# Patient Record
Sex: FEMALE | Race: WHITE | NOT HISPANIC OR LATINO | Employment: UNEMPLOYED | URBAN - METROPOLITAN AREA
[De-identification: names, ages, dates, MRNs, and addresses within clinical notes are randomized per-mention and may not be internally consistent; named-entity substitution may affect disease eponyms.]

---

## 2017-07-12 ENCOUNTER — HOSPITAL ENCOUNTER (OUTPATIENT)
Dept: LAB | Facility: MEDICAL CENTER | Age: 32
End: 2017-07-12
Attending: FAMILY MEDICINE
Payer: OTHER GOVERNMENT

## 2017-07-12 ENCOUNTER — TELEPHONE (OUTPATIENT)
Dept: MEDICAL GROUP | Facility: PHYSICIAN GROUP | Age: 32
End: 2017-07-12

## 2017-07-12 ENCOUNTER — OFFICE VISIT (OUTPATIENT)
Dept: MEDICAL GROUP | Facility: PHYSICIAN GROUP | Age: 32
End: 2017-07-12
Payer: OTHER GOVERNMENT

## 2017-07-12 VITALS
SYSTOLIC BLOOD PRESSURE: 116 MMHG | HEART RATE: 78 BPM | OXYGEN SATURATION: 97 % | TEMPERATURE: 98.1 F | BODY MASS INDEX: 39.4 KG/M2 | WEIGHT: 260 LBS | DIASTOLIC BLOOD PRESSURE: 68 MMHG | HEIGHT: 68 IN

## 2017-07-12 DIAGNOSIS — E66.9 OBESITY (BMI 35.0-39.9 WITHOUT COMORBIDITY): ICD-10-CM

## 2017-07-12 DIAGNOSIS — Z13.220 ENCOUNTER FOR SCREENING FOR LIPID DISORDER: ICD-10-CM

## 2017-07-12 DIAGNOSIS — E89.0 POSTOPERATIVE HYPOTHYROIDISM: ICD-10-CM

## 2017-07-12 DIAGNOSIS — Z86.39 HISTORY OF GRAVES' DISEASE: ICD-10-CM

## 2017-07-12 DIAGNOSIS — Z13.21 ENCOUNTER FOR VITAMIN DEFICIENCY SCREENING: ICD-10-CM

## 2017-07-12 DIAGNOSIS — Z85.850 HISTORY OF THYROID CANCER: ICD-10-CM

## 2017-07-12 LAB
25(OH)D3 SERPL-MCNC: 20 NG/ML (ref 30–100)
ALBUMIN SERPL BCP-MCNC: 4.3 G/DL (ref 3.2–4.9)
ALBUMIN/GLOB SERPL: 1.4 G/DL
ALP SERPL-CCNC: 55 U/L (ref 30–99)
ALT SERPL-CCNC: 13 U/L (ref 2–50)
ANION GAP SERPL CALC-SCNC: 8 MMOL/L (ref 0–11.9)
AST SERPL-CCNC: 14 U/L (ref 12–45)
BASOPHILS # BLD AUTO: 0.4 % (ref 0–1.8)
BASOPHILS # BLD: 0.03 K/UL (ref 0–0.12)
BILIRUB SERPL-MCNC: 0.4 MG/DL (ref 0.1–1.5)
BUN SERPL-MCNC: 11 MG/DL (ref 8–22)
CALCIUM SERPL-MCNC: 9.1 MG/DL (ref 8.5–10.5)
CHLORIDE SERPL-SCNC: 106 MMOL/L (ref 96–112)
CHOLEST SERPL-MCNC: 215 MG/DL (ref 100–199)
CO2 SERPL-SCNC: 26 MMOL/L (ref 20–33)
CREAT SERPL-MCNC: 0.74 MG/DL (ref 0.5–1.4)
EOSINOPHIL # BLD AUTO: 0 K/UL (ref 0–0.51)
EOSINOPHIL NFR BLD: 0 % (ref 0–6.9)
ERYTHROCYTE [DISTWIDTH] IN BLOOD BY AUTOMATED COUNT: 40.5 FL (ref 35.9–50)
GFR SERPL CREATININE-BSD FRML MDRD: >60 ML/MIN/1.73 M 2
GLOBULIN SER CALC-MCNC: 3 G/DL (ref 1.9–3.5)
GLUCOSE SERPL-MCNC: 87 MG/DL (ref 65–99)
HCT VFR BLD AUTO: 43.4 % (ref 37–47)
HDLC SERPL-MCNC: 45 MG/DL
HGB BLD-MCNC: 14.4 G/DL (ref 12–16)
IMM GRANULOCYTES # BLD AUTO: 0.02 K/UL (ref 0–0.11)
IMM GRANULOCYTES NFR BLD AUTO: 0.3 % (ref 0–0.9)
LDLC SERPL CALC-MCNC: 133 MG/DL
LYMPHOCYTES # BLD AUTO: 3.09 K/UL (ref 1–4.8)
LYMPHOCYTES NFR BLD: 41.5 % (ref 22–41)
MCH RBC QN AUTO: 29.4 PG (ref 27–33)
MCHC RBC AUTO-ENTMCNC: 33.2 G/DL (ref 33.6–35)
MCV RBC AUTO: 88.6 FL (ref 81.4–97.8)
MONOCYTES # BLD AUTO: 0.31 K/UL (ref 0–0.85)
MONOCYTES NFR BLD AUTO: 4.2 % (ref 0–13.4)
NEUTROPHILS # BLD AUTO: 3.99 K/UL (ref 2–7.15)
NEUTROPHILS NFR BLD: 53.6 % (ref 44–72)
NRBC # BLD AUTO: 0 K/UL
NRBC BLD AUTO-RTO: 0 /100 WBC
PLATELET # BLD AUTO: 246 K/UL (ref 164–446)
PMV BLD AUTO: 10.4 FL (ref 9–12.9)
POTASSIUM SERPL-SCNC: 4.3 MMOL/L (ref 3.6–5.5)
PROT SERPL-MCNC: 7.3 G/DL (ref 6–8.2)
RBC # BLD AUTO: 4.9 M/UL (ref 4.2–5.4)
SODIUM SERPL-SCNC: 140 MMOL/L (ref 135–145)
TRIGL SERPL-MCNC: 184 MG/DL (ref 0–149)
TSH SERPL DL<=0.005 MIU/L-ACNC: 1.2 UIU/ML (ref 0.3–3.7)
WBC # BLD AUTO: 7.4 K/UL (ref 4.8–10.8)

## 2017-07-12 PROCEDURE — 99204 OFFICE O/P NEW MOD 45 MIN: CPT | Performed by: FAMILY MEDICINE

## 2017-07-12 PROCEDURE — 82306 VITAMIN D 25 HYDROXY: CPT

## 2017-07-12 PROCEDURE — 36415 COLL VENOUS BLD VENIPUNCTURE: CPT

## 2017-07-12 PROCEDURE — 80061 LIPID PANEL: CPT

## 2017-07-12 PROCEDURE — 80053 COMPREHEN METABOLIC PANEL: CPT

## 2017-07-12 PROCEDURE — 84443 ASSAY THYROID STIM HORMONE: CPT

## 2017-07-12 PROCEDURE — 85025 COMPLETE CBC W/AUTO DIFF WBC: CPT

## 2017-07-12 RX ORDER — LEVOTHYROXINE SODIUM 112 UG/1
112 TABLET ORAL
COMMUNITY
End: 2017-07-12

## 2017-07-12 RX ORDER — LEVOTHYROXINE SODIUM 0.1 MG/1
100 TABLET ORAL
Qty: 30 TAB | Refills: 3 | Status: SHIPPED | OUTPATIENT
Start: 2017-07-12 | End: 2017-10-02 | Stop reason: SDUPTHER

## 2017-07-12 RX ORDER — LEVOTHYROXINE SODIUM 112 UG/1
112 TABLET ORAL
Qty: 30 TAB | Refills: 3 | Status: SHIPPED | OUTPATIENT
Start: 2017-07-12 | End: 2017-10-02 | Stop reason: SDUPTHER

## 2017-07-12 RX ORDER — LEVOTHYROXINE SODIUM 0.1 MG/1
100 TABLET ORAL
COMMUNITY
End: 2017-07-12

## 2017-07-12 ASSESSMENT — PAIN SCALES - GENERAL: PAINLEVEL: NO PAIN

## 2017-07-12 ASSESSMENT — PATIENT HEALTH QUESTIONNAIRE - PHQ9: CLINICAL INTERPRETATION OF PHQ2 SCORE: 0

## 2017-07-12 NOTE — Clinical Note
[facility]  Pcp Pt States None  No address on file  Fax: [phone]   Authorization for Release/Disclosure of   Protected Health Information   Name: CHELSEY SHAH : 1985 SSN: [id number]   Address: [address] [place] [address]  [place] [address] Phone:    [phone] (home)    I authorize the entity listed below to release/disclose the PHI below to:   [facility]/Pcp Pt States None and Balbir Arvizu M.D.   Provider or Entity Name:  [facility]   Address   City, [facility]   Phone:  [phone]    Fax:     Reason for request: continuity of care   Information to be released:    [  ] LAST COLONOSCOPY,  including any PATH REPORT and follow-up  [  ] LAST FIT/COLOGUARD RESULT [  ] LAST DEXA  [  ] LAST MAMMOGRAM  [  ] LAST PAP  [  ] LAST LABS [  ] RETINA EXAM REPORT  [  ] IMMUNIZATION RECORDS  [XX  ] Release all info FROM THYROID REMOVAL SURGY      [  ] Check here and initial the line next to each item to release ALL health information INCLUDING  _____ Care and treatment for drug and / or alcohol abuse  _____ HIV testing, infection status, or AIDS  _____ Genetic Testing    DATES OF SERVICE OR TIME PERIOD TO BE DISCLOSED: _____________  I understand and acknowledge that:  * This Authorization may be revoked at any time by you in writing, except if your health information has already been used or disclosed.  * Your health information that will be used or disclosed as a result of you signing this authorization could be re-disclosed by the recipient. If this occurs, your re-disclosed health information may no longer be protected by State or Federal laws.  * You may refuse to sign this Authorization. Your refusal will not affect your ability to obtain treatment.  * This Authorization becomes effective upon signing and will  on (date) __________.      If no date is indicated, this Authorization will  one (1) year from the signature date.    Name: Chelsey Shah    Signature:   Date:     2017            PLEASE FAX REQUESTED RECORDS BACK TO: [phone]

## 2017-07-12 NOTE — MR AVS SNAPSHOT
"        Meli Shah   2017 8:40 AM   Office Visit   MRN: [id number]    Department:  [facility]   Dept Phone:  [phone]    Description:  Female : 1985   Provider:  Balbir Arvizu M.D.           Reason for Visit     Establish Care           Allergies as of 2017     Allergen Noted Reactions    Morphine 2017   Itching      You were diagnosed with     Postoperative hypothyroidism   [446585]       History of thyroid cancer   [183008]       Obesity (BMI 35.0-39.9 without comorbidity) ([facility])   [386208]       Encounter for screening for lipid disorder   [8734379]       Encounter for vitamin deficiency screening   [596941]       History of Graves' disease   [276491]         Vital Signs     Blood Pressure Pulse Temperature Height Weight Body Mass Index    116/68 mmHg 78 36.7 °C (98.1 °F) 1.727 m (5' 8\") 117.935 kg (260 lb) 39.54 kg/m2    Oxygen Saturation Last Menstrual Period Breastfeeding? Smoking Status          97% 2017 No Never Smoker         Basic Information     Date Of Birth Sex Race Ethnicity Preferred Language    1985 Female White Non- English      Your appointments     Jin 15, 2018  8:00 AM   Established Patient with Balbir Arvizu M.D.   [facility] ([address] [address] [phone]           You will be receiving a confirmation call a few days before your appointment from our automated call confirmation system.              Problem List              ICD-10-CM Priority Class Noted - Resolved    Postoperative hypothyroidism E89.0   2017 - Present    History of thyroid cancer Z85.850   2017 - Present    Obesity (BMI 35.0-39.9 without comorbidity) (HCC) E66.9   2017 - Present    History of Graves' disease Z86.39   2017 - Present      Health Maintenance        Date Due Completion Dates    IMM DTaP/Tdap/Td Vaccine (1 - Tdap) 3/28/2003 ---    PAP SMEAR 3/28/2005 ---    IMM INFLUENZA (1) 2017 " ---            Current Immunizations     No immunizations on file.      Below and/or attached are the medications your provider expects you to take. Review all of your home medications and newly ordered medications with your provider and/or pharmacist. Follow medication instructions as directed by your provider and/or pharmacist. Please keep your medication list with you and share with your provider. Update the information when medications are discontinued, doses are changed, or new medications (including over-the-counter products) are added; and carry medication information at all times in the event of emergency situations     Allergies:  MORPHINE - Itching               Medications  Valid as of: July 12, 2017 -  9:22 AM    Generic Name Brand Name Tablet Size Instructions for use    Levothyroxine Sodium (Tab) SYNTHROID 100 MCG Take 1 Tab by mouth Every morning on an empty stomach.        Levothyroxine Sodium (Tab) SYNTHROID 112 MCG Take 1 Tab by mouth Every morning on an empty stomach.        .                 Medicines prescribed today were sent to:     [id number] - [place], [place] - [address] [address]    Phone: [phone] Fax: [phone]    Open 24 Hours?: No      Medication refill instructions:       If your prescription bottle indicates you have medication refills left, it is not necessary to call your provider’s office. Please contact your pharmacy and they will refill your medication.    If your prescription bottle indicates you do not have any refills left, you may request refills at any time through one of the following ways: The online [employer] system (except Urgent Care), by calling your provider’s office, or by asking your pharmacy to contact your provider’s office with a refill request. Medication refills are processed only during regular business hours and may not be available until the next business day. Your provider may request additional  information or to have a follow-up visit with you prior to refilling your medication.   *Please Note: Medication refills are assigned a new Rx number when refilled electronically. Your pharmacy may indicate that no refills were authorized even though a new prescription for the same medication is available at the pharmacy. Please request the medicine by name with the pharmacy before contacting your provider for a refill.        Your To Do List     Future Labs/Procedures Complete By Expires    CBC WITH DIFFERENTIAL  As directed 7/13/2018    COMP METABOLIC PANEL  As directed 7/13/2018    LIPID PROFILE  As directed 7/13/2018    TSH WITH REFLEX TO FT4  As directed 7/12/2018    US-SOFT TISSUES OF HEAD - NECK  As directed 7/12/2018    VITAMIN D,25 HYDROXY  As directed 7/13/2018         MyChart Access Code: Activation code not generated  Current StudyRoom Status: Active

## 2017-07-12 NOTE — PROGRESS NOTES
Meli Shah is a 32 y.o. female here for establishing care and discuss thyroid issues.  HPI:  Patient moved to the area recently with her family from [place].    Her chronic medical conditions include:    1. Postsurgical hypothyroidism: Patient was diagnosed with Graves' disease in 2013 and had total thyroidectomy in 2015 after she completed her pregnancy with her youngest son. Per patient, pathology results had shown evidence of thyroid cancer, not sure about the type. Postsurgery, patient has been on thyroid replacement. She takes Synthroid 100 µg and 112 µg daily.  She notes that she has not had any thyroid labs done in almost a year. She feels that her thyroid levels might be off because she gets mood swings and anger episodes.  She denies any hair or skin changes. Denies fatigue, constipation and diarrhea, palpitations.  She tries to exercise 3 days a week.    2. Skin rash behind the ear:  Patient has noticed some itching and scaling of the back of the left ear lobe for the last 2 weeks. She has been applying lotion but it has not improved. She has not tried any over-the-counter medications.    Current medicines (including changes today)  Current Outpatient Prescriptions   Medication Sig Dispense Refill   • levothyroxine (SYNTHROID) 100 MCG Tab Take 1 Tab by mouth Every morning on an empty stomach. 30 Tab 3   • levothyroxine (SYNTHROID) 112 MCG Tab Take 1 Tab by mouth Every morning on an empty stomach. 30 Tab 3     No current facility-administered medications for this visit.     She  has a past medical history of Graves disease (2014) and Hypothyroidism (acquired). She also has no past medical history of Encounter for long-term (current) use of other medications.  She  has past surgical history that includes thyroidectomy total (2015) and primary c section.  Social History   Substance Use Topics   • Smoking status: Never Smoker    • Smokeless tobacco: Never Used   • Alcohol Use: No     Social History  "    Social History Narrative   • No narrative on file     Family History   Problem Relation Age of Onset   • Hyperlipidemia Mother    • Cancer Father      Prostate   • Other Sister      PCOS     Family Status   Relation Status Death Age   • Mother Alive    • Father Alive    • Sister Alive          ROS  Denies fever, chills, sweats, recent weight change  Skin: negative for rash, scaling, itching, pigmentation, hair or nail changes.  Eyes: negative for visual blurring, double vision, eye pain, floaters and discharge from eyes  Ears/Nose/Throat: negative for tinnitus, vertigo, bleeding gums, dental problem and hoarseness, frequent URI's, sinus trouble, persistent sore throat  Respiratory: negative for persistent cough, hemoptysis, dyspnea  Cardiovascular: negative for palpitations, irregular heart beat, chest pain, or peripheral edema.  Gastrointestinal: negative for poor appetite, dysphagia, nausea, heartburn or reflux, abdominal pain, constipation or diarrhea  Genitourinary: negative for nocturia, dysuria, frequency, hesitancy, incontinence  Musculoskeletal: negative for joint pain/swelling and muscle pain/ soreness  Neurologic: negative for headaches, involuntary movements or tremor,muscle weakness  Psychiatric: negative for sleep disturbance, anxiety, depression  Hematologic/Lymphatic/Immunologic: negative for anemia, unusual bruising, swollen glands  Endocrine: negative for temperature intolerance, polydipsia, polyuria, unintentional weight changes.        Objective:     Blood pressure 116/68, pulse 78, temperature 36.7 °C (98.1 °F), height 1.727 m (5' 8\"), weight 117.935 kg (260 lb), last menstrual period 06/22/2017, SpO2 97 %, not currently breastfeeding. Body mass index is 39.54 kg/(m^2).    Physical Exam:    GEN: Alert and oriented,well appearing, no acute distress  SKIN: warm, dry to touch, no rashes or lesions in visible areas  PSYCH: mood and affect normal, judgement normal  EYES: Conjunctiva clear, lids " normal,pupils equal round and reactive  ENMT: Normal external nose and ears,EACs normal appearing, TM pearly gray with normal light reflex bilaterally,nasal mucosa and turbinates normal appearing without erythema or edema, lips without lesions,good dentition,oropharynx clear  Neck : Trachea midline, no masses or swelling, no palpable thyroid tissue, two linear well healed surgical scars on the neck  LYMPHATIC : No cervical or supraclavicular lymphadenopathy  RESPIRATORY : Unlabored respiratory effort, no distress noted, clear to auscultation bilaterally, no wheeze, rhonchi, crackles  CARDIOVASCULAR: RRR, S1 S2 normal, no murmurs, no edema of the extremities, pedal pulses B/L 2+  GI: Soft, Non tender, No rebound or guarding, no hepatosplenomegaly, no masses  MUSCULOSKELETAL : Normal gait and stance, no obvious abnormalities   NEURO: No overt focal neurologic deficits,sensation intact      Assessment and Plan:   The following treatment plan was discussed    1. Postoperative hypothyroidism  New problem.Refilled Synthroid at current dose.Ordered thyroid labs.Will adjust medication as needed based on results.  - levothyroxine (SYNTHROID) 100 MCG Tab; Take 1 Tab by mouth Every morning on an empty stomach.  Dispense: 30 Tab; Refill: 3  - levothyroxine (SYNTHROID) 112 MCG Tab; Take 1 Tab by mouth Every morning on an empty stomach.  Dispense: 30 Tab; Refill: 3  - CBC WITH DIFFERENTIAL; Future  - COMP METABOLIC PANEL; Future  - TSH WITH REFLEX TO FT4; Future    2. History of thyroid cancer  S/p total thyroidectomy.Will obtain records.Will order head and neck Ultrasound for baseline  - US-SOFT TISSUES OF HEAD - NECK; Future    3. Obesity (BMI 35.0-39.9 without comorbidity) (HCC)  - Patient identified as having weight management issue.  Appropriate orders and counseling given.    4. Encounter for screening for lipid disorder  - LIPID PROFILE; Future    5. Encounter for vitamin deficiency screening  - VITAMIN D,25 HYDROXY;  Future    6. History of Graves' disease  - levothyroxine (SYNTHROID) 100 MCG Tab; Take 1 Tab by mouth Every morning on an empty stomach.  Dispense: 30 Tab; Refill: 3  - levothyroxine (SYNTHROID) 112 MCG Tab; Take 1 Tab by mouth Every morning on an empty stomach.  Dispense: 30 Tab; Refill: 3  - CBC WITH DIFFERENTIAL; Future  - COMP METABOLIC PANEL; Future  - TSH WITH REFLEX TO FT4; Future    Patient had recent Pap in less than a year and it was normal.Will obtain records.      Records requested.  Followup: Return in about 6 months (around 1/12/2018) for follow up on chronic medical conditions,short.         Please note that this dictation was created using voice recognition software. I have made every reasonable attempt to correct obvious errors, but I expect that there are errors of grammar and possibly content that I did not discover before finalizing the note.

## 2017-07-13 NOTE — TELEPHONE ENCOUNTER
----- Message from Balbir Arvizu M.D. sent at 7/12/2017  4:44 PM PDT -----  Here are your lab results.    Your cholesterol levels are significantly high for your age.It is important to start life style changes ncluding diet and exercise targeted to lose weight.Avoid saturated fat which are found in meats that come from a cow or pig, and found in creams, cheeses, butter, lua, and fried foods.Avoid sugary treats, rice, pasta, pizza and other sources of carbohydrates.Eat more vegetables, fruits, fish, nuts, olive oil and exercising 5 times a week for 30 minutes.  If you would like to see a Nutritionist let me know , I will place a referral.    Your vitamin D level is low.You will need to take Vitamin D 5000 units daily.this is available at any pharmacy.    Your thyroid level is normal.rest of the labs are normal.Continue current dose of Synthroid.    I would like to repeat your labs and monitor your progress in 6 months.Please call  and make an appointment to see me in 6 months.    Balbir Arvizu M.D.

## 2017-08-14 ENCOUNTER — HOSPITAL ENCOUNTER (OUTPATIENT)
Dept: RADIOLOGY | Facility: MEDICAL CENTER | Age: 32
End: 2017-08-14
Attending: FAMILY MEDICINE
Payer: OTHER GOVERNMENT

## 2017-08-14 DIAGNOSIS — Z85.850 HISTORY OF THYROID CANCER: ICD-10-CM

## 2017-08-14 PROCEDURE — 76536 US EXAM OF HEAD AND NECK: CPT

## 2017-10-02 DIAGNOSIS — E89.0 POSTOPERATIVE HYPOTHYROIDISM: ICD-10-CM

## 2017-10-02 RX ORDER — LEVOTHYROXINE SODIUM 0.1 MG/1
100 TABLET ORAL
Qty: 30 TAB | Refills: 6 | Status: SHIPPED | OUTPATIENT
Start: 2017-10-02 | End: 2018-01-28

## 2017-10-02 RX ORDER — LEVOTHYROXINE SODIUM 112 UG/1
112 TABLET ORAL
Qty: 30 TAB | Refills: 6 | Status: SHIPPED | OUTPATIENT
Start: 2017-10-02 | End: 2018-01-28

## 2017-10-02 NOTE — TELEPHONE ENCOUNTER
Requested Prescriptions     Signed Prescriptions Disp Refills   • levothyroxine (SYNTHROID) 100 MCG Tab 30 Tab 6     Sig: Take 1 Tab by mouth Every morning on an empty stomach.     Authorizing Provider: MARIA D VILLASEÑOR   • levothyroxine (SYNTHROID) 112 MCG Tab 30 Tab 6     Sig: Take 1 Tab by mouth Every morning on an empty stomach.     Authorizing Provider: MARIA D VILLASEÑOR A.P.R.N.

## 2018-01-19 ENCOUNTER — OFFICE VISIT (OUTPATIENT)
Dept: MEDICAL GROUP | Facility: PHYSICIAN GROUP | Age: 33
End: 2018-01-19
Payer: OTHER GOVERNMENT

## 2018-01-19 VITALS
OXYGEN SATURATION: 97 % | TEMPERATURE: 97.5 F | BODY MASS INDEX: 40.01 KG/M2 | SYSTOLIC BLOOD PRESSURE: 112 MMHG | HEIGHT: 68 IN | HEART RATE: 68 BPM | DIASTOLIC BLOOD PRESSURE: 64 MMHG | WEIGHT: 264 LBS

## 2018-01-19 DIAGNOSIS — Z23 NEED FOR VACCINATION: ICD-10-CM

## 2018-01-19 DIAGNOSIS — Z32.01 POSITIVE PREGNANCY TEST: ICD-10-CM

## 2018-01-19 DIAGNOSIS — B07.9 VIRAL WARTS, UNSPECIFIED TYPE: ICD-10-CM

## 2018-01-19 DIAGNOSIS — E89.0 POSTOPERATIVE HYPOTHYROIDISM: ICD-10-CM

## 2018-01-19 DIAGNOSIS — E55.9 VITAMIN D DEFICIENCY: ICD-10-CM

## 2018-01-19 DIAGNOSIS — E78.5 DYSLIPIDEMIA: ICD-10-CM

## 2018-01-19 PROCEDURE — 99213 OFFICE O/P EST LOW 20 MIN: CPT | Mod: 25 | Performed by: FAMILY MEDICINE

## 2018-01-19 PROCEDURE — 90471 IMMUNIZATION ADMIN: CPT | Performed by: FAMILY MEDICINE

## 2018-01-19 PROCEDURE — 17110 DESTRUCTION B9 LES UP TO 14: CPT | Performed by: FAMILY MEDICINE

## 2018-01-19 PROCEDURE — 90686 IIV4 VACC NO PRSV 0.5 ML IM: CPT | Performed by: FAMILY MEDICINE

## 2018-01-19 ASSESSMENT — PAIN SCALES - GENERAL: PAINLEVEL: NO PAIN

## 2018-01-19 NOTE — PROGRESS NOTES
"Subjective:   Meli Shah is a 32 y.o. female here today for positive pregnancy test and wart on left fifth finger     HPI :     Patient tested positive with a home pregnancy test. She is excited about this. Her last menstrual period was on December 8. Denies any morning sickness. Reports having some cramps but denies any vaginal spotting, bleeding. She is also currently taking her prenatal vitamins. She has not established with an obstetrician yet.    She is also here for wart on her left fifth finger. This has been there for several months. She has tried several over-the-counter medications without much improvement. No pain, abnormal discharge.      Current medicines (including changes today)  Current Outpatient Prescriptions   Medication Sig Dispense Refill   • levothyroxine (SYNTHROID) 100 MCG Tab Take 1 Tab by mouth Every morning on an empty stomach. 30 Tab 6   • levothyroxine (SYNTHROID) 112 MCG Tab Take 1 Tab by mouth Every morning on an empty stomach. 30 Tab 6     No current facility-administered medications for this visit.      She  has a past medical history of Graves disease (2014) and Hypothyroidism (acquired). She also has no past medical history of Encounter for long-term (current) use of other medications.    ROS   No chest pain, no shortness of breath, no abdominal pain       Objective:     Blood pressure 112/64, pulse 68, temperature 36.4 °C (97.5 °F), height 1.727 m (5' 8\"), weight 119.7 kg (264 lb), SpO2 97 %. Body mass index is 40.14 kg/m².     PHYSICAL EXAM     GEN: Alert and oriented,well appearing, no acute distress  SKIN: warm, dry to touch, wart on the lateral border of left 5th finger  PSYCH: mood and affect normal, judgement normal  EYES: Conjunctiva clear, lids normal,pupils equal round and reactive  ENMT: Normal external nose and ears,EACs normal appearing, TM pearly gray with normal light reflex bilaterally,nasal mucosa and turbinates normal appearing without erythema or edema, " lips without lesions,good dentition,oropharynx clear  Neck : Trachea midline, no masses or swelling  LYMPHATIC : No cervical or supraclavicular lymphadenopathy  RESPIRATORY : Unlabored respiratory effort, no distress noted, clear to auscultation bilaterally, no wheeze, rhonchi, crackles  CARDIOVASCULAR: RRR, S1 S2 normal, no murmurs , gallop , no carotid bruit, no edema of the extremities  MUSCULOSKELETAL : Normal gait and stance, no obvious abnormalities   NEURO: No overt focal neurologic deficits,sensation intact    CRYOTHERAPY:  Discussed risks and benefits of cryotherapy. Patient verbally agreed. 3 applications of cryotherapy were applied to the lesion on left 5th finger. Patient tolerated procedure well. Aftercare instructions given.        Assessment and Plan:   The following treatment plan was discussed    1. Positive pregnancy test  New problem.Ultrasound ordered.Will refer patient to OB/Gyn for pregnancy management. 1st trimester precautions discussed.Continue prenatal vitamins.  - US-OB PELVIS TRANSVAGINAL; Future    2. Postoperative hypothyroidism  This is a chronic medical condition.Controlled.Continue current medications.  - TSH WITH REFLEX TO FT4; Future  - COMP METABOLIC PANEL; Future    3. Vitamin D deficiency  Continue Vit D supplement.  - VITAMIN D,25 HYDROXY; Future    4. Dyslipidemia  - LIPID PROFILE; Future    5. Need for vaccination  - INFLUENZA VACCINE QUAD INJ >3Y(PF)    6. Viral warts, unspecified type  Cryotherapy performed as described above.      Followup: Return for follow up on chronic med conditions.         Please note that this dictation was created using voice recognition software. I have made every reasonable attempt to correct obvious errors, but I expect that there are errors of grammar and possibly content that I did not discover before finalizing the note.

## 2018-01-22 ENCOUNTER — HOSPITAL ENCOUNTER (OUTPATIENT)
Dept: LAB | Facility: MEDICAL CENTER | Age: 33
End: 2018-01-22
Attending: FAMILY MEDICINE
Payer: OTHER GOVERNMENT

## 2018-01-22 DIAGNOSIS — E55.9 VITAMIN D DEFICIENCY: ICD-10-CM

## 2018-01-22 DIAGNOSIS — E78.5 DYSLIPIDEMIA: ICD-10-CM

## 2018-01-22 DIAGNOSIS — E89.0 POSTOPERATIVE HYPOTHYROIDISM: ICD-10-CM

## 2018-01-22 LAB
25(OH)D3 SERPL-MCNC: 12 NG/ML (ref 30–100)
ALBUMIN SERPL BCP-MCNC: 4.3 G/DL (ref 3.2–4.9)
ALBUMIN/GLOB SERPL: 1.5 G/DL
ALP SERPL-CCNC: 35 U/L (ref 30–99)
ALT SERPL-CCNC: 14 U/L (ref 2–50)
ANION GAP SERPL CALC-SCNC: 8 MMOL/L (ref 0–11.9)
AST SERPL-CCNC: 16 U/L (ref 12–45)
BILIRUB SERPL-MCNC: 0.5 MG/DL (ref 0.1–1.5)
BUN SERPL-MCNC: 10 MG/DL (ref 8–22)
CALCIUM SERPL-MCNC: 8.6 MG/DL (ref 8.5–10.5)
CHLORIDE SERPL-SCNC: 105 MMOL/L (ref 96–112)
CHOLEST SERPL-MCNC: 180 MG/DL (ref 100–199)
CO2 SERPL-SCNC: 24 MMOL/L (ref 20–33)
CREAT SERPL-MCNC: 0.64 MG/DL (ref 0.5–1.4)
GLOBULIN SER CALC-MCNC: 2.9 G/DL (ref 1.9–3.5)
GLUCOSE SERPL-MCNC: 84 MG/DL (ref 65–99)
HDLC SERPL-MCNC: 53 MG/DL
LDLC SERPL CALC-MCNC: 98 MG/DL
POTASSIUM SERPL-SCNC: 4.2 MMOL/L (ref 3.6–5.5)
PROT SERPL-MCNC: 7.2 G/DL (ref 6–8.2)
SODIUM SERPL-SCNC: 137 MMOL/L (ref 135–145)
T4 FREE SERPL-MCNC: 1.43 NG/DL (ref 0.53–1.43)
TRIGL SERPL-MCNC: 143 MG/DL (ref 0–149)
TSH SERPL DL<=0.005 MIU/L-ACNC: 0.08 UIU/ML (ref 0.38–5.33)

## 2018-01-22 PROCEDURE — 80061 LIPID PANEL: CPT

## 2018-01-22 PROCEDURE — 82306 VITAMIN D 25 HYDROXY: CPT

## 2018-01-22 PROCEDURE — 84439 ASSAY OF FREE THYROXINE: CPT

## 2018-01-22 PROCEDURE — 36415 COLL VENOUS BLD VENIPUNCTURE: CPT

## 2018-01-22 PROCEDURE — 80053 COMPREHEN METABOLIC PANEL: CPT

## 2018-01-22 PROCEDURE — 84443 ASSAY THYROID STIM HORMONE: CPT

## 2018-01-23 ENCOUNTER — PATIENT MESSAGE (OUTPATIENT)
Dept: MEDICAL GROUP | Facility: PHYSICIAN GROUP | Age: 33
End: 2018-01-23

## 2018-01-23 DIAGNOSIS — Z3A.01 LESS THAN 8 WEEKS GESTATION OF PREGNANCY: ICD-10-CM

## 2018-01-24 ENCOUNTER — PATIENT MESSAGE (OUTPATIENT)
Dept: MEDICAL GROUP | Facility: PHYSICIAN GROUP | Age: 33
End: 2018-01-24

## 2018-01-25 NOTE — TELEPHONE ENCOUNTER
From: Meli Shah  To: Balbir Arvizu M.D.  Sent: 2018 9:40 AM PST  Subject: Non-Urgent Medical Question    Eulogio Shah 2010, thank you again  ----- Message -----  From: Balbir Arvizu M.D.  Sent: 2018 8:46 PM PST  To: Meli Shah  Subject: RE: Non-Urgent Medical Question  Please send the full name and  of your son, so that I can locate his chart.    Thank You  Balbir Arvizu M.D.      ----- Message -----   From: Meli Shah   Sent: 2018 9:39 AM PST   To: Balbir Arvizu M.D.  Subject: Non-Urgent Medical Question    Hi there,  Due to our insurance changing , Eulogio my son, whom you saw in 2017 is in need of another referral to his specialist that he sees. Selvin Cervantes [phone] [address] [place] [address] [address]. Thank you so much for all you do.

## 2018-01-26 ENCOUNTER — PATIENT MESSAGE (OUTPATIENT)
Dept: MEDICAL GROUP | Facility: PHYSICIAN GROUP | Age: 33
End: 2018-01-26

## 2018-01-26 DIAGNOSIS — E89.0 POSTOPERATIVE HYPOTHYROIDISM: ICD-10-CM

## 2018-01-28 RX ORDER — LEVOTHYROXINE SODIUM 0.2 MG/1
200 TABLET ORAL
Qty: 30 TAB | Refills: 5 | Status: SHIPPED | OUTPATIENT
Start: 2018-01-28 | End: 2018-08-20 | Stop reason: SDUPTHER

## 2018-01-29 ENCOUNTER — PATIENT MESSAGE (OUTPATIENT)
Dept: MEDICAL GROUP | Facility: PHYSICIAN GROUP | Age: 33
End: 2018-01-29

## 2018-01-29 NOTE — TELEPHONE ENCOUNTER
From: Meli Shah  To: Balbir Arvizu M.D.  Sent: 1/29/2018 10:50 AM PST  Subject: Test Result Question    Thank you. Are you sending the new dosage prescription to [facility]? That is where I usually  my prescriptions. And I will vern my calendar to get labs done in 6 weeks. Thank you again  ----- Message -----  From: Balbir Arvizu M.D.  Sent: 1/28/2018 10:37 PM PST  To: Meli Shah  Subject: RE: Test Result Question  We will need to reduce your total dose.Therefore we will change it to 200 mcg daily instead of 212 mcg daily.You can take 2 100 mcg tabs daily.I will also send a new prescription for 200 mcg.It is important to closely monitor your levels.I will place a lab order.Please have the lab done in 6 weeks.    Balbir Arvizu M.D.      ----- Message -----   From: Meli Shah   Sent: 1/26/2018 7:01 AM PST   To: Balbir Arvizu M.D.  Subject: Test Result Question    Hi there, I take 112 and 100, both of them in the morning.

## 2018-01-31 ENCOUNTER — APPOINTMENT (OUTPATIENT)
Dept: RADIOLOGY | Facility: MEDICAL CENTER | Age: 33
End: 2018-01-31
Attending: FAMILY MEDICINE
Payer: OTHER GOVERNMENT

## 2018-01-31 DIAGNOSIS — Z32.01 POSITIVE PREGNANCY TEST: ICD-10-CM

## 2018-01-31 DIAGNOSIS — Z3A.01 LESS THAN 8 WEEKS GESTATION OF PREGNANCY: ICD-10-CM

## 2018-01-31 PROCEDURE — 76817 TRANSVAGINAL US OBSTETRIC: CPT

## 2018-02-01 ENCOUNTER — PATIENT MESSAGE (OUTPATIENT)
Dept: MEDICAL GROUP | Facility: PHYSICIAN GROUP | Age: 33
End: 2018-02-01

## 2018-02-06 ENCOUNTER — PATIENT MESSAGE (OUTPATIENT)
Dept: MEDICAL GROUP | Facility: PHYSICIAN GROUP | Age: 33
End: 2018-02-06

## 2018-03-05 ENCOUNTER — OFFICE VISIT (OUTPATIENT)
Dept: MEDICAL GROUP | Facility: PHYSICIAN GROUP | Age: 33
End: 2018-03-05
Payer: OTHER GOVERNMENT

## 2018-03-05 VITALS
SYSTOLIC BLOOD PRESSURE: 110 MMHG | WEIGHT: 270 LBS | HEART RATE: 74 BPM | OXYGEN SATURATION: 100 % | BODY MASS INDEX: 40.92 KG/M2 | TEMPERATURE: 97.5 F | HEIGHT: 68 IN | DIASTOLIC BLOOD PRESSURE: 68 MMHG

## 2018-03-05 DIAGNOSIS — J01.00 ACUTE NON-RECURRENT MAXILLARY SINUSITIS: ICD-10-CM

## 2018-03-05 DIAGNOSIS — B07.9 VIRAL WARTS, UNSPECIFIED TYPE: ICD-10-CM

## 2018-03-05 PROCEDURE — 17110 DESTRUCTION B9 LES UP TO 14: CPT | Performed by: FAMILY MEDICINE

## 2018-03-05 PROCEDURE — 99214 OFFICE O/P EST MOD 30 MIN: CPT | Mod: 25 | Performed by: FAMILY MEDICINE

## 2018-03-05 RX ORDER — AMOXICILLIN AND CLAVULANATE POTASSIUM 875; 125 MG/1; MG/1
1 TABLET, FILM COATED ORAL 2 TIMES DAILY
Qty: 14 TAB | Refills: 0 | Status: SHIPPED | OUTPATIENT
Start: 2018-03-05 | End: 2018-03-12

## 2018-03-05 ASSESSMENT — PAIN SCALES - GENERAL: PAINLEVEL: 3=SLIGHT PAIN

## 2018-03-05 NOTE — PROGRESS NOTES
"Subjective:   Meli Shah is a 32 y.o. female here today for possible sinus infection and wart    HPI :   13 weeks pregnant patient who will have her first OB appointment soon.  Here with complaint of headache, sinus pressure, nasal congestion for about 10 days. Associated with mild earache bilaterally. Not having much nasal discharge. It hurts to wear glasses. Feels good to put cold washcloth on the face. Has been using over-the-counter MediPort without much help.  No fever, chills, body aches, shortness of breath, cough.     She also has a wart that was treated with cryotherapy 6 weeks back on her finger. She states that it decreased in size for a while but then again grew back.      Current medicines (including changes today)  Current Outpatient Prescriptions   Medication Sig Dispense Refill   • amoxicillin-clavulanate (AUGMENTIN) 875-125 MG Tab Take 1 Tab by mouth 2 times a day for 7 days. 14 Tab 0   • levothyroxine (SYNTHROID) 200 MCG Tab Take 1 Tab by mouth Every morning on an empty stomach. 30 Tab 5     No current facility-administered medications for this visit.      She  has a past medical history of Graves disease (2014) and Hypothyroidism (acquired). She also has no past medical history of Encounter for long-term (current) use of other medications.    ROS   No chest pain, no shortness of breath, no abdominal pain       Objective:     Blood pressure 110/68, pulse 74, temperature 36.4 °C (97.5 °F), height 1.727 m (5' 8\"), weight 122.5 kg (270 lb), SpO2 100 %. Body mass index is 41.05 kg/m².     PHYSICAL EXAM     GEN: Alert and oriented,well appearing, no acute distress  SKIN: warm, dry to touch, wart on left fifth finger  PSYCH: mood and affect normal, judgement normal  EYES: Conjunctiva clear, lids normal,pupils equal round and reactive  ENMT: Normal external nose and ears,EACs normal appearing, TM pearly gray with normal light reflex bilaterally,nasal mucosa and turbinates erythematous , lips without " lesions,good dentition,oropharynx clear, ++ sinus tenderness  Neck : Trachea midline, no masses or swelling, no thyromegaly  LYMPHATIC : No cervical or supraclavicular lymphadenopathy  RESPIRATORY : Unlabored respiratory effort, no distress noted, clear to auscultation bilaterally, no wheeze, rhonchi, crackles  CARDIOVASCULAR: RRR, S1 S2 normal, no murmurs  MUSCULOSKELETAL : Normal gait and stance, no obvious abnormalities   NEURO: No overt focal neurologic deficits,sensation intact    CRYOTHERAPY:  Discussed risks and benefits of cryotherapy. Patient verbally agreed. 3 applications of cryotherapy were applied to wart lesion on left 5th finger. Patient tolerated procedure well. Aftercare instructions given.      Assessment and Plan:   The following treatment plan was discussed    1. Acute non-recurrent maxillary sinusitis  New problem. Given duration of symptoms will start her on antibiotics Augmentin for 7 days. Advised her to continue MediPort and also may use Rhinocort nasal spray. Discussed safety of medications during pregnancy.    2. Viral warts, unspecified type  Cryotherapy performed as described above. Patient tolerated procedure well with return in 3 weeks for follow-up.    Followup: Return for follow up on wart.         Please note that this dictation was created using voice recognition software. I have made every reasonable attempt to correct obvious errors, but I expect that there are errors of grammar and possibly content that I did not discover before finalizing the note.

## 2018-03-13 ENCOUNTER — HOSPITAL ENCOUNTER (OUTPATIENT)
Dept: LAB | Facility: MEDICAL CENTER | Age: 33
End: 2018-03-13
Attending: FAMILY MEDICINE
Payer: OTHER GOVERNMENT

## 2018-03-13 DIAGNOSIS — E89.0 POSTOPERATIVE HYPOTHYROIDISM: ICD-10-CM

## 2018-03-13 LAB — TSH SERPL DL<=0.005 MIU/L-ACNC: 2.59 UIU/ML (ref 0.38–5.33)

## 2018-03-13 PROCEDURE — 84443 ASSAY THYROID STIM HORMONE: CPT

## 2018-03-13 PROCEDURE — 36415 COLL VENOUS BLD VENIPUNCTURE: CPT

## 2018-03-28 ENCOUNTER — HOSPITAL ENCOUNTER (OUTPATIENT)
Dept: LAB | Facility: MEDICAL CENTER | Age: 33
End: 2018-03-28
Attending: OBSTETRICS & GYNECOLOGY
Payer: OTHER GOVERNMENT

## 2018-03-28 PROCEDURE — 88175 CYTOPATH C/V AUTO FLUID REDO: CPT

## 2018-03-28 PROCEDURE — 87591 N.GONORRHOEAE DNA AMP PROB: CPT

## 2018-03-28 PROCEDURE — 87491 CHLMYD TRACH DNA AMP PROBE: CPT

## 2018-03-30 LAB
C TRACH DNA GENITAL QL NAA+PROBE: NEGATIVE
CYTOLOGY REG CYTOL: NORMAL
N GONORRHOEA DNA GENITAL QL NAA+PROBE: NEGATIVE
SPECIMEN SOURCE: NORMAL

## 2018-04-10 ENCOUNTER — HOSPITAL ENCOUNTER (OUTPATIENT)
Dept: LAB | Facility: MEDICAL CENTER | Age: 33
End: 2018-04-10
Attending: INTERNAL MEDICINE
Payer: OTHER GOVERNMENT

## 2018-04-10 LAB
ABO GROUP BLD: NORMAL
BASOPHILS # BLD AUTO: 0.3 % (ref 0–1.8)
BASOPHILS # BLD: 0.03 K/UL (ref 0–0.12)
BLD GP AB INVEST PLASRBC-IMP: NORMAL
BLD GP AB SCN SERPL QL: NORMAL
EOSINOPHIL # BLD AUTO: 0 K/UL (ref 0–0.51)
EOSINOPHIL NFR BLD: 0 % (ref 0–6.9)
ERYTHROCYTE [DISTWIDTH] IN BLOOD BY AUTOMATED COUNT: 44.3 FL (ref 35.9–50)
HBV SURFACE AG SER QL: NEGATIVE
HCT VFR BLD AUTO: 39.8 % (ref 37–47)
HGB BLD-MCNC: 13.7 G/DL (ref 12–16)
IMM GRANULOCYTES # BLD AUTO: 0.03 K/UL (ref 0–0.11)
IMM GRANULOCYTES NFR BLD AUTO: 0.3 % (ref 0–0.9)
LYMPHOCYTES # BLD AUTO: 1.83 K/UL (ref 1–4.8)
LYMPHOCYTES NFR BLD: 20.4 % (ref 22–41)
MCH RBC QN AUTO: 30.9 PG (ref 27–33)
MCHC RBC AUTO-ENTMCNC: 34.4 G/DL (ref 33.6–35)
MCV RBC AUTO: 89.8 FL (ref 81.4–97.8)
MONOCYTES # BLD AUTO: 0.39 K/UL (ref 0–0.85)
MONOCYTES NFR BLD AUTO: 4.3 % (ref 0–13.4)
NEUTROPHILS # BLD AUTO: 6.7 K/UL (ref 2–7.15)
NEUTROPHILS NFR BLD: 74.7 % (ref 44–72)
NRBC # BLD AUTO: 0 K/UL
NRBC BLD-RTO: 0 /100 WBC
PLATELET # BLD AUTO: 190 K/UL (ref 164–446)
PMV BLD AUTO: 11 FL (ref 9–12.9)
RBC # BLD AUTO: 4.43 M/UL (ref 4.2–5.4)
RH BLD: NORMAL
RUBV AB SER QL: 322.4 IU/ML
TREPONEMA PALLIDUM IGG+IGM AB [PRESENCE] IN SERUM OR PLASMA BY IMMUNOASSAY: NON REACTIVE
WBC # BLD AUTO: 9 K/UL (ref 4.8–10.8)

## 2018-04-10 PROCEDURE — 87340 HEPATITIS B SURFACE AG IA: CPT

## 2018-04-10 PROCEDURE — 86905 BLOOD TYPING RBC ANTIGENS: CPT

## 2018-04-10 PROCEDURE — 36415 COLL VENOUS BLD VENIPUNCTURE: CPT

## 2018-04-10 PROCEDURE — 86850 RBC ANTIBODY SCREEN: CPT

## 2018-04-10 PROCEDURE — 86900 BLOOD TYPING SEROLOGIC ABO: CPT

## 2018-04-10 PROCEDURE — 87086 URINE CULTURE/COLONY COUNT: CPT

## 2018-04-10 PROCEDURE — 86762 RUBELLA ANTIBODY: CPT

## 2018-04-10 PROCEDURE — 86886 COOMBS TEST INDIRECT TITER: CPT

## 2018-04-10 PROCEDURE — 86870 RBC ANTIBODY IDENTIFICATION: CPT

## 2018-04-10 PROCEDURE — 85025 COMPLETE CBC W/AUTO DIFF WBC: CPT

## 2018-04-10 PROCEDURE — 86901 BLOOD TYPING SEROLOGIC RH(D): CPT

## 2018-04-10 PROCEDURE — 86780 TREPONEMA PALLIDUM: CPT

## 2018-04-11 LAB — XXX BLOOD GROUP AB TITR SERPL AHG: 16 {TITER}

## 2018-04-11 PROCEDURE — 86886 COOMBS TEST INDIRECT TITER: CPT

## 2018-04-12 LAB
BACTERIA UR CULT: NORMAL
SIGNIFICANT IND 70042: NORMAL
SITE SITE: NORMAL
SOURCE SOURCE: NORMAL

## 2018-04-17 ENCOUNTER — PATIENT MESSAGE (OUTPATIENT)
Dept: MEDICAL GROUP | Facility: PHYSICIAN GROUP | Age: 33
End: 2018-04-17

## 2018-05-02 ENCOUNTER — PATIENT MESSAGE (OUTPATIENT)
Dept: MEDICAL GROUP | Facility: PHYSICIAN GROUP | Age: 33
End: 2018-05-02

## 2018-05-02 DIAGNOSIS — E89.0 POSTOPERATIVE HYPOTHYROIDISM: ICD-10-CM

## 2018-05-02 NOTE — TELEPHONE ENCOUNTER
From: Meli Shah  To: Balbir Arvizu M.D.  Sent: 2018 10:27 AM PDT  Subject: Non-Urgent Medical Question    I am currently 20 weeks and 5 days. So 3rd trimester is about 26 weeks, right? My expected delivery date is , but due to the repeat  Dr. Prieto said she would take me at 39 weeks most likely.  ----- Message -----  From: Balbir Arvizu M.D.  Sent: 2018 9:24 PM PDT  To: Meli Shah  Subject: RE: Non-Urgent Medical Question  Yes we can follow your levothyroxine dose and make adjustments as necessary.How many weeks are you currently and when is your expected delivery date? Let me know and I will let you know when we will check it again.  Regarding your baby's doctor, it will be best if you are able to find a pediatrician.    Let me know if you have any questions.    Balbir Arvizu M.D.        ----- Message -----   From: Meli Shah   Sent: 2018 11:07 AM PDT   To: Balbir Arvizu M.D.  Subject: Non-Urgent Medical Question    Hi Dr. Arvizu,  I have been into see my OBGYN and she was wanting you to keep following me for my thyroid levels throughout the pregnancy. I believe we have one for the 1st and 2nd trimester so she has said we will need another one when I reach my 3rd trimester. Let me know if that is ok. Also i was wondering if you would be the new baby's doctor? Unless you would rather they be seen by a pedestrian? I am happy with you and would like all my kids to see you if its possible?

## 2018-05-07 ENCOUNTER — PATIENT MESSAGE (OUTPATIENT)
Dept: MEDICAL GROUP | Facility: PHYSICIAN GROUP | Age: 33
End: 2018-05-07

## 2018-05-31 ENCOUNTER — PATIENT MESSAGE (OUTPATIENT)
Dept: MEDICAL GROUP | Facility: PHYSICIAN GROUP | Age: 33
End: 2018-05-31

## 2018-06-19 ENCOUNTER — HOSPITAL ENCOUNTER (OUTPATIENT)
Dept: LAB | Facility: MEDICAL CENTER | Age: 33
End: 2018-06-19
Attending: OBSTETRICS & GYNECOLOGY
Payer: OTHER GOVERNMENT

## 2018-06-19 ENCOUNTER — HOSPITAL ENCOUNTER (OUTPATIENT)
Dept: LAB | Facility: MEDICAL CENTER | Age: 33
End: 2018-06-19
Attending: FAMILY MEDICINE
Payer: OTHER GOVERNMENT

## 2018-06-19 DIAGNOSIS — E89.0 POSTOPERATIVE HYPOTHYROIDISM: ICD-10-CM

## 2018-06-19 LAB
GLUCOSE 1H P 50 G GLC PO SERPL-MCNC: 155 MG/DL (ref 70–139)
HCT VFR BLD AUTO: 36.2 % (ref 37–47)
HGB BLD-MCNC: 12 G/DL (ref 12–16)
PLATELET # BLD AUTO: 166 K/UL (ref 164–446)
TREPONEMA PALLIDUM IGG+IGM AB [PRESENCE] IN SERUM OR PLASMA BY IMMUNOASSAY: NON REACTIVE
TSH SERPL DL<=0.005 MIU/L-ACNC: 3.78 UIU/ML (ref 0.38–5.33)

## 2018-06-19 PROCEDURE — 85049 AUTOMATED PLATELET COUNT: CPT

## 2018-06-19 PROCEDURE — 82950 GLUCOSE TEST: CPT

## 2018-06-19 PROCEDURE — 85014 HEMATOCRIT: CPT

## 2018-06-19 PROCEDURE — 36415 COLL VENOUS BLD VENIPUNCTURE: CPT

## 2018-06-19 PROCEDURE — 84443 ASSAY THYROID STIM HORMONE: CPT

## 2018-06-19 PROCEDURE — 85018 HEMOGLOBIN: CPT

## 2018-06-19 PROCEDURE — 86780 TREPONEMA PALLIDUM: CPT

## 2018-06-27 ENCOUNTER — HOSPITAL ENCOUNTER (OUTPATIENT)
Dept: LAB | Facility: MEDICAL CENTER | Age: 33
End: 2018-06-27
Attending: OBSTETRICS & GYNECOLOGY
Payer: OTHER GOVERNMENT

## 2018-06-27 LAB
GLUCOSE 1H P CHAL SERPL-MCNC: 171 MG/DL (ref 65–180)
GLUCOSE 2H P CHAL SERPL-MCNC: 144 MG/DL (ref 65–155)
GLUCOSE 3H P CHAL SERPL-MCNC: 84 MG/DL (ref 65–140)
GLUCOSE BS SERPL-MCNC: 80 MG/DL (ref 65–95)

## 2018-06-27 PROCEDURE — 82951 GLUCOSE TOLERANCE TEST (GTT): CPT

## 2018-06-27 PROCEDURE — 82952 GTT-ADDED SAMPLES: CPT

## 2018-06-27 PROCEDURE — 36415 COLL VENOUS BLD VENIPUNCTURE: CPT

## 2018-08-10 ENCOUNTER — HOSPITAL ENCOUNTER (OUTPATIENT)
Dept: LAB | Facility: MEDICAL CENTER | Age: 33
End: 2018-08-10
Attending: OBSTETRICS & GYNECOLOGY
Payer: OTHER GOVERNMENT

## 2018-08-10 PROCEDURE — 87653 STREP B DNA AMP PROBE: CPT

## 2018-08-12 LAB — GP B STREP DNA SPEC QL NAA+PROBE: NEGATIVE

## 2018-08-20 ENCOUNTER — OFFICE VISIT (OUTPATIENT)
Dept: MEDICAL GROUP | Facility: PHYSICIAN GROUP | Age: 33
End: 2018-08-20
Payer: OTHER GOVERNMENT

## 2018-08-20 VITALS
RESPIRATION RATE: 16 BRPM | SYSTOLIC BLOOD PRESSURE: 110 MMHG | DIASTOLIC BLOOD PRESSURE: 60 MMHG | HEIGHT: 68 IN | BODY MASS INDEX: 44.1 KG/M2 | WEIGHT: 291 LBS | OXYGEN SATURATION: 96 % | TEMPERATURE: 98.1 F | HEART RATE: 68 BPM

## 2018-08-20 DIAGNOSIS — Z3A.38 38 WEEKS GESTATION OF PREGNANCY: ICD-10-CM

## 2018-08-20 DIAGNOSIS — E89.0 POSTOPERATIVE HYPOTHYROIDISM: ICD-10-CM

## 2018-08-20 PROBLEM — Z32.01 POSITIVE PREGNANCY TEST: Status: RESOLVED | Noted: 2018-01-19 | Resolved: 2018-08-20

## 2018-08-20 PROBLEM — Z3A.01 LESS THAN 8 WEEKS GESTATION OF PREGNANCY: Status: RESOLVED | Noted: 2018-01-23 | Resolved: 2018-08-20

## 2018-08-20 PROCEDURE — 99214 OFFICE O/P EST MOD 30 MIN: CPT | Performed by: NURSE PRACTITIONER

## 2018-08-20 RX ORDER — LEVOTHYROXINE SODIUM 0.2 MG/1
200 TABLET ORAL
Qty: 90 TAB | Refills: 0 | Status: SHIPPED | OUTPATIENT
Start: 2018-08-20 | End: 2018-11-01

## 2018-08-20 RX ORDER — LEVOTHYROXINE SODIUM 112 UG/1
TABLET ORAL
COMMUNITY
Start: 2018-08-14 | End: 2018-08-20

## 2018-08-20 ASSESSMENT — PATIENT HEALTH QUESTIONNAIRE - PHQ9: CLINICAL INTERPRETATION OF PHQ2 SCORE: 0

## 2018-08-20 ASSESSMENT — PAIN SCALES - GENERAL: PAINLEVEL: NO PAIN

## 2018-08-20 NOTE — ASSESSMENT & PLAN NOTE
Patient is doing well today.  Blood pressure is 110/60.  Patient has repeat  scheduled with Dr. Prieto at [facility] on the .  Patient's new baby is expected to be a boy and she is hoping he can establish with our office.

## 2018-08-20 NOTE — ASSESSMENT & PLAN NOTE
Labwork  up to date, within normal limits.  Taking medicine as directed. Denies palpitations, skin changes, temperature intolerance, changes in bowel habits.  Refilled levothyroxine 200 mcg refilled at this time.

## 2018-08-21 NOTE — PROGRESS NOTES
Chief Complaint   Patient presents with   • Establish Care     N2U       HISTORY OF THE PRESENT ILLNESS: This is a 33 y.o. female new patient to our practice. This pleasant patient is here today to discuss hypothyroidism.    Postoperative hypothyroidism  Labwork  up to date, within normal limits.  Taking medicine as directed. Denies palpitations, skin changes, temperature intolerance, changes in bowel habits.  Refilled levothyroxine 200 mcg refilled at this time.      38 weeks gestation of pregnancy  Patient is doing well today.  Blood pressure is 110/60.  Patient has repeat  scheduled with Dr. Prieto at [facility] on the .  Patient's new baby is expected to be a boy and she is hoping he can establish with our office.       Past Medical History:   Diagnosis Date   • Graves disease    • Hypothyroidism (acquired)        Past Surgical History:   Procedure Laterality Date   • THYROIDECTOMY TOTAL     • PRIMARY C SECTION      , ,        Family Status   Relation Status   • Mo Alive   • Fa Alive   • Sis Alive     Family History   Problem Relation Age of Onset   • Hyperlipidemia Mother    • Cancer Father         Prostate   • Other Sister         PCOS       Social History   Substance Use Topics   • Smoking status: Never Smoker   • Smokeless tobacco: Never Used   • Alcohol use No       Allergies: Morphine    Current Outpatient Prescriptions Ordered in [facility]   Medication Sig Dispense Refill   • levothyroxine (SYNTHROID) 200 MCG Tab Take 1 Tab by mouth Every morning on an empty stomach. 90 Tab 0     No current Epic-ordered facility-administered medications on file.        Review of Systems   Constitutional:  Negative for fever, chills, weight loss and malaise/fatigue.   HENT:  Negative for ear pain, nosebleeds, congestion, sore throat and neck pain.    Eyes:  Negative for blurred vision.   Respiratory:  Negative for cough, sputum production, shortness of breath and wheezing.    Cardiovascular:   "Negative for chest pain, palpitations, orthopnea and leg swelling.   Gastrointestinal:  Negative for heartburn, nausea, vomiting and abdominal pain.   Genitourinary:  Negative for dysuria, urgency and frequency.   Musculoskeletal:  Negative for myalgias, back pain and joint pain.   Skin:  Negative for rash and itching.   Neurological:  Negative for dizziness, tingling, tremors, sensory change, focal weakness and headaches.   Endo/Heme/Allergies:  Does not bruise/bleed easily.   Psychiatric/Behavioral:  Negative for depression, anxiety, or memory loss.     All other systems reviewed and are negative except as in HPI.    Exam: Blood pressure 110/60, pulse 68, temperature 36.7 °C (98.1 °F), resp. rate 16, height 1.727 m (5' 8\"), weight (!) 132 kg (291 lb), last menstrual period 12/08/2017, SpO2 96 %, not currently breastfeeding.  General:  Normal appearing. No distress.  Pulmonary:  Clear to ausculation.  Normal effort. No rales, ronchi, or wheezing.  Cardiovascular:  Regular rate and rhythm without murmur. Carotid and radial pulses are intact and equal bilaterally.  Abdomen:  Soft, nontender, nondistended. Normal bowel sounds. Liver and spleen are not palpable  Neurologic:  Grossly nonfocal  Lymph:  No cervical, supraclavicular or axillary lymph nodes are palpable  Skin:  Warm and dry.  No obvious lesions.  Musculoskeletal:  Normal gait. No extremity cyanosis, clubbing, or edema.  Psych:  Normal mood and affect. Alert and oriented x3. Judgment and insight is normal.    PLAN:    1. 38 weeks gestation of pregnancy  Continue follow-up with Dr. Prieto.    2. Postoperative hypothyroidism  Patient will continue current medication, follow-up post delivery.  - levothyroxine (SYNTHROID) 200 MCG Tab; Take 1 Tab by mouth Every morning on an empty stomach.  Dispense: 90 Tab; Refill: 0    Follow-up in 1 month or sooner as needed. Patient is encouraged to be seen in the emergency room for chest pain, palpitations, shortness of " breath, dizziness, severe abdominal pain or other concerning symptoms.    Please note that this dictation was created using voice recognition software. I have made every reasonable attempt to correct obvious errors, but I expect that there are errors of grammar and possibly content that I did not discover before finalizing the note.      Assessment/Plan  1. 38 weeks gestation of pregnancy     2. Postoperative hypothyroidism  levothyroxine (SYNTHROID) 200 MCG Tab         I have placed the below orders and discussed them with an approved delegating provider. The MA is performing the below orders under the direction of Dr. Mondragon.

## 2018-08-22 ENCOUNTER — APPOINTMENT (OUTPATIENT)
Dept: ADMISSIONS | Facility: MEDICAL CENTER | Age: 33
End: 2018-08-22
Attending: OBSTETRICS & GYNECOLOGY
Payer: OTHER GOVERNMENT

## 2018-08-29 NOTE — H&P
DATE OF ADMISSION:  2018    ADMITTING DIAGNOSES:  1.  Intrauterine pregnancy at 38 weeks.  2.  Prior  section x3.  3.  Desires permanent sterility.  4.  Anti-c antigen positive.  5.  Morbid obesity.  6.  Graves disease.    HISTORY OF PRESENT ILLNESS:  This is a 33-year-old  4, para 3 with an   estimated date of confinement of 2018 established by last menstrual   period consistent with a 15-week ultrasound who will be presenting to [facility] labor and delivery on 2018 for a repeat    section at 38 weeks.  The patient's pregnancy has been complicated by anti-c   antigen positive status, MCA Dopplers have been reassuring and the   recommendation has been made by Dr. Conteh to perform repeat    section and bilateral tubal ligation at 38 weeks.  The pros and cons, risks   and benefits of the procedures have been reviewed with the patient and   include, but are not limited to bleeding, infection, damage to bowel, bladder,   nerves, the baby or other organs, need for life saving blood transfusion or   hysterectomy, complications with future pregnancies, complications with   anesthesia and death.  The tubal ligation carries risk factors of a failure   rate of 1 in 100, a 50% increased risk of an ectopic pregnancy and the issue   of regret.  All of her questions have been answered and consent forms have   been signed.    Prenatal care has been with Dr. Prieto.  Her estimated date of confinement of   2018 was established by last menstrual period consistent with a 15-week   ultrasound.    PRENATAL LABS:  Her blood type is A positive, antibody screen is positive for   anti-c antigen.  She is hepatitis B negative, hepatitis C negative, HIV   negative.  Her 1-hour Glucola was abnormal.  Her 3-hour oral glucose tolerance   test 0/4 were abnormal.  Group B strep status is negative.  She declined any   chromosomal or genetic screening for the  pregnancy.    COMPLICATIONS WITH THE PREGNANCY:  Include morbid obesity, prior    section x3.  This pregnancy, the placenta is anterior and does not involve her   prior  scar.  She has been followed by Dr. Conteh for anti-c   antigen positivity.  She has been having twice weekly nonstress test and has   been seen every 2 weeks for middle cerebral artery Doppler studies, which have   been normal.  Total weight gain for the pregnancy has been 12 pounds.    ALLERGIES:  SHE IS ALLERGIC TO MORPHINE, WHICH CAUSES ITCHING, BUT SHE IS ABLE   TO TAKE ORAL PERCOCET.    MEDICATIONS:  Include prenatal vitamins and levothyroxine 200 mcg p.o. daily.    PAST MEDICAL HISTORY:  Significant for Graves disease.    PAST SURGICAL HISTORY:  Significant for  section x3 and thyroidectomy.    SOCIAL HISTORY:  She denies tobacco, alcohol or IV drug use.    FAMILY HISTORY:  She has a maternal great grandmother and a maternal great   aunt with colon cancer.    GYNECOLOGIC HISTORY:  She has no history of any HSV or abnormal paps.    OBSTETRICAL HISTORY:  She has had 3  sections.  She had some mild   blood pressure issues.  Her first son was diagnosed with [place]-Stout   disorder.  Her other children are not effected.    PHYSICAL EXAMINATION:  VITAL SIGNS:  She is 287 pounds, 68 inches tall.  Blood pressure is 132/84.  GENERAL:  She is pleasant, in no apparent distress.  LUNGS:  Clear to auscultation bilaterally.  CARDIOVASCULAR:  Regular rate and rhythm.  ABDOMEN:  Obese, gravid, and nontender.  EXTREMITIES:  Showed trace pedal edema.  She has no cords or Homans sign.  The   fetus was most recently in the vertex presentation.    IMPRESSION:  This is a 33-year-old  4, para 3 who will be presenting to   [facility] labor and delivery on 2018 at 38 weeks   for repeat  section and bilateral tubal ligation.    PLAN:  1.  Consent forms have been signed and are on record in our  office.  2.  She will be n.p.o. in accordance with associated anesthesia guideline.  3.  Blood bank will be notified of her anti-c antigen positivity.       ____________________________________     MD SAMMY BRADY / MUNIR    DD:  08/29/2018 08:54:03  DT:  08/29/2018 09:23:38    D#:  [id number]  Job#:  [id number]    cc: AWILDA GUADALUPE MD

## 2018-08-31 ENCOUNTER — HOSPITAL ENCOUNTER (INPATIENT)
Facility: MEDICAL CENTER | Age: 33
LOS: 2 days | End: 2018-09-02
Attending: OBSTETRICS & GYNECOLOGY | Admitting: OBSTETRICS & GYNECOLOGY
Payer: OTHER GOVERNMENT

## 2018-08-31 DIAGNOSIS — G89.18 POST-OPERATIVE PAIN: ICD-10-CM

## 2018-08-31 LAB
BASOPHILS # BLD AUTO: 0.2 % (ref 0–1.8)
BASOPHILS # BLD: 0.02 K/UL (ref 0–0.12)
EOSINOPHIL # BLD AUTO: 0 K/UL (ref 0–0.51)
EOSINOPHIL NFR BLD: 0 % (ref 0–6.9)
ERYTHROCYTE [DISTWIDTH] IN BLOOD BY AUTOMATED COUNT: 43.2 FL (ref 35.9–50)
HCT VFR BLD AUTO: 37.3 % (ref 37–47)
HGB BLD-MCNC: 12.3 G/DL (ref 12–16)
HOLDING TUBE BB 8507: NORMAL
IMM GRANULOCYTES # BLD AUTO: 0.04 K/UL (ref 0–0.11)
IMM GRANULOCYTES NFR BLD AUTO: 0.4 % (ref 0–0.9)
LYMPHOCYTES # BLD AUTO: 1.79 K/UL (ref 1–4.8)
LYMPHOCYTES NFR BLD: 18.7 % (ref 22–41)
MCH RBC QN AUTO: 29.4 PG (ref 27–33)
MCHC RBC AUTO-ENTMCNC: 33 G/DL (ref 33.6–35)
MCV RBC AUTO: 89 FL (ref 81.4–97.8)
MONOCYTES # BLD AUTO: 0.41 K/UL (ref 0–0.85)
MONOCYTES NFR BLD AUTO: 4.3 % (ref 0–13.4)
NEUTROPHILS # BLD AUTO: 7.33 K/UL (ref 2–7.15)
NEUTROPHILS NFR BLD: 76.4 % (ref 44–72)
NRBC # BLD AUTO: 0 K/UL
NRBC BLD-RTO: 0 /100 WBC
PLATELET # BLD AUTO: 168 K/UL (ref 164–446)
PMV BLD AUTO: 11.7 FL (ref 9–12.9)
RBC # BLD AUTO: 4.19 M/UL (ref 4.2–5.4)
WBC # BLD AUTO: 9.6 K/UL (ref 4.8–10.8)

## 2018-08-31 PROCEDURE — A6212 FOAM DRG <=16 SQ IN W/BORDER: HCPCS

## 2018-08-31 PROCEDURE — 305385 HCHG SURGICAL SERVICES 1/4 HOUR: Performed by: OBSTETRICS & GYNECOLOGY

## 2018-08-31 PROCEDURE — 36415 COLL VENOUS BLD VENIPUNCTURE: CPT

## 2018-08-31 PROCEDURE — 700111 HCHG RX REV CODE 636 W/ 250 OVERRIDE (IP)

## 2018-08-31 PROCEDURE — 304964 HCHG RECOVERY ROOM TIME 1HR: Performed by: OBSTETRICS & GYNECOLOGY

## 2018-08-31 PROCEDURE — 700102 HCHG RX REV CODE 250 W/ 637 OVERRIDE(OP)

## 2018-08-31 PROCEDURE — 700111 HCHG RX REV CODE 636 W/ 250 OVERRIDE (IP): Performed by: ANESTHESIOLOGY

## 2018-08-31 PROCEDURE — 59514 CESAREAN DELIVERY ONLY: CPT

## 2018-08-31 PROCEDURE — 770002 HCHG ROOM/CARE - OB PRIVATE (112)

## 2018-08-31 PROCEDURE — 700105 HCHG RX REV CODE 258: Performed by: ANESTHESIOLOGY

## 2018-08-31 PROCEDURE — 85025 COMPLETE CBC W/AUTO DIFF WBC: CPT

## 2018-08-31 PROCEDURE — 700101 HCHG RX REV CODE 250

## 2018-08-31 PROCEDURE — 306828 HCHG ANES-TIME GENERAL: Performed by: OBSTETRICS & GYNECOLOGY

## 2018-08-31 PROCEDURE — 88302 TISSUE EXAM BY PATHOLOGIST: CPT | Mod: 59

## 2018-08-31 RX ORDER — DIPHENHYDRAMINE HYDROCHLORIDE 50 MG/ML
12.5 INJECTION INTRAMUSCULAR; INTRAVENOUS EVERY 6 HOURS PRN
Status: DISCONTINUED | OUTPATIENT
Start: 2018-08-31 | End: 2018-09-01

## 2018-08-31 RX ORDER — CITRIC ACID/SODIUM CITRATE 334-500MG
SOLUTION, ORAL ORAL
Status: COMPLETED
Start: 2018-08-31 | End: 2018-08-31

## 2018-08-31 RX ORDER — METOCLOPRAMIDE HYDROCHLORIDE 5 MG/ML
10 INJECTION INTRAMUSCULAR; INTRAVENOUS ONCE
Status: COMPLETED | OUTPATIENT
Start: 2018-08-31 | End: 2018-08-31

## 2018-08-31 RX ORDER — SODIUM CHLORIDE, SODIUM GLUCONATE, SODIUM ACETATE, POTASSIUM CHLORIDE AND MAGNESIUM CHLORIDE 526; 502; 368; 37; 30 MG/100ML; MG/100ML; MG/100ML; MG/100ML; MG/100ML
1500 INJECTION, SOLUTION INTRAVENOUS ONCE
Status: COMPLETED | OUTPATIENT
Start: 2018-08-31 | End: 2018-08-31

## 2018-08-31 RX ORDER — NALOXONE HYDROCHLORIDE 0.4 MG/ML
0.1 INJECTION, SOLUTION INTRAMUSCULAR; INTRAVENOUS; SUBCUTANEOUS PRN
Status: DISCONTINUED | OUTPATIENT
Start: 2018-08-31 | End: 2018-09-01

## 2018-08-31 RX ORDER — SODIUM CHLORIDE, SODIUM LACTATE, POTASSIUM CHLORIDE, CALCIUM CHLORIDE 600; 310; 30; 20 MG/100ML; MG/100ML; MG/100ML; MG/100ML
INJECTION, SOLUTION INTRAVENOUS PRN
Status: DISCONTINUED | OUTPATIENT
Start: 2018-08-31 | End: 2018-09-02 | Stop reason: HOSPADM

## 2018-08-31 RX ORDER — ONDANSETRON 2 MG/ML
4 INJECTION INTRAMUSCULAR; INTRAVENOUS EVERY 6 HOURS PRN
Status: DISCONTINUED | OUTPATIENT
Start: 2018-08-31 | End: 2018-09-01

## 2018-08-31 RX ORDER — OXYCODONE HYDROCHLORIDE AND ACETAMINOPHEN 5; 325 MG/1; MG/1
1 TABLET ORAL EVERY 4 HOURS PRN
Status: DISCONTINUED | OUTPATIENT
Start: 2018-08-31 | End: 2018-09-01

## 2018-08-31 RX ORDER — HYDROMORPHONE HYDROCHLORIDE 2 MG/ML
0.2 INJECTION, SOLUTION INTRAMUSCULAR; INTRAVENOUS; SUBCUTANEOUS
Status: DISCONTINUED | OUTPATIENT
Start: 2018-08-31 | End: 2018-09-01

## 2018-08-31 RX ORDER — MISOPROSTOL 200 UG/1
800 TABLET ORAL
Status: DISCONTINUED | OUTPATIENT
Start: 2018-08-31 | End: 2018-08-31 | Stop reason: HOSPADM

## 2018-08-31 RX ORDER — CITRIC ACID/SODIUM CITRATE 334-500MG
30 SOLUTION, ORAL ORAL ONCE
Status: COMPLETED | OUTPATIENT
Start: 2018-08-31 | End: 2018-08-31

## 2018-08-31 RX ORDER — DOCUSATE SODIUM 100 MG/1
100 CAPSULE, LIQUID FILLED ORAL 2 TIMES DAILY PRN
Status: DISCONTINUED | OUTPATIENT
Start: 2018-08-31 | End: 2018-09-02 | Stop reason: HOSPADM

## 2018-08-31 RX ORDER — MISOPROSTOL 200 UG/1
800 TABLET ORAL
Status: DISCONTINUED | OUTPATIENT
Start: 2018-08-31 | End: 2018-09-02 | Stop reason: HOSPADM

## 2018-08-31 RX ORDER — SIMETHICONE 80 MG
80 TABLET,CHEWABLE ORAL 4 TIMES DAILY PRN
Status: DISCONTINUED | OUTPATIENT
Start: 2018-08-31 | End: 2018-09-02 | Stop reason: HOSPADM

## 2018-08-31 RX ORDER — DIPHENHYDRAMINE HYDROCHLORIDE 50 MG/ML
25 INJECTION INTRAMUSCULAR; INTRAVENOUS EVERY 6 HOURS PRN
Status: DISCONTINUED | OUTPATIENT
Start: 2018-08-31 | End: 2018-09-01

## 2018-08-31 RX ORDER — OXYCODONE HYDROCHLORIDE AND ACETAMINOPHEN 5; 325 MG/1; MG/1
2 TABLET ORAL EVERY 4 HOURS PRN
Status: DISCONTINUED | OUTPATIENT
Start: 2018-08-31 | End: 2018-09-01

## 2018-08-31 RX ORDER — METOCLOPRAMIDE HYDROCHLORIDE 5 MG/ML
INJECTION INTRAMUSCULAR; INTRAVENOUS
Status: COMPLETED
Start: 2018-08-31 | End: 2018-08-31

## 2018-08-31 RX ORDER — KETOROLAC TROMETHAMINE 30 MG/ML
30 INJECTION, SOLUTION INTRAMUSCULAR; INTRAVENOUS EVERY 6 HOURS
Status: DISCONTINUED | OUTPATIENT
Start: 2018-08-31 | End: 2018-09-01

## 2018-08-31 RX ORDER — METHYLERGONOVINE MALEATE 0.2 MG/ML
0.2 INJECTION INTRAVENOUS
Status: DISCONTINUED | OUTPATIENT
Start: 2018-08-31 | End: 2018-09-02 | Stop reason: HOSPADM

## 2018-08-31 RX ORDER — CARBOPROST TROMETHAMINE 250 UG/ML
250 INJECTION, SOLUTION INTRAMUSCULAR
Status: DISCONTINUED | OUTPATIENT
Start: 2018-08-31 | End: 2018-09-02 | Stop reason: HOSPADM

## 2018-08-31 RX ORDER — VITAMIN A ACETATE, BETA CAROTENE, ASCORBIC ACID, CHOLECALCIFEROL, .ALPHA.-TOCOPHEROL ACETATE, DL-, THIAMINE MONONITRATE, RIBOFLAVIN, NIACINAMIDE, PYRIDOXINE HYDROCHLORIDE, FOLIC ACID, CYANOCOBALAMIN, CALCIUM CARBONATE, FERROUS FUMARATE, ZINC OXIDE, CUPRIC OXIDE 3080; 12; 120; 400; 1; 1.84; 3; 20; 22; 920; 25; 200; 27; 10; 2 [IU]/1; UG/1; MG/1; [IU]/1; MG/1; MG/1; MG/1; MG/1; MG/1; [IU]/1; MG/1; MG/1; MG/1; MG/1; MG/1
1 TABLET, FILM COATED ORAL EVERY MORNING
Status: DISCONTINUED | OUTPATIENT
Start: 2018-09-01 | End: 2018-09-02 | Stop reason: HOSPADM

## 2018-08-31 RX ADMIN — KETOROLAC TROMETHAMINE 30 MG: 30 INJECTION, SOLUTION INTRAMUSCULAR at 19:10

## 2018-08-31 RX ADMIN — SODIUM CITRATE AND CITRIC ACID MONOHYDRATE 30 ML: 500; 334 SOLUTION ORAL at 11:46

## 2018-08-31 RX ADMIN — SODIUM CHLORIDE, SODIUM GLUCONATE, SODIUM ACETATE, POTASSIUM CHLORIDE AND MAGNESIUM CHLORIDE 1500 ML: 526; 502; 368; 37; 30 INJECTION, SOLUTION INTRAVENOUS at 11:45

## 2018-08-31 RX ADMIN — METOCLOPRAMIDE 10 MG: 5 INJECTION, SOLUTION INTRAMUSCULAR; INTRAVENOUS at 11:46

## 2018-08-31 RX ADMIN — METOCLOPRAMIDE HYDROCHLORIDE 10 MG: 5 INJECTION INTRAMUSCULAR; INTRAVENOUS at 11:46

## 2018-08-31 RX ADMIN — Medication 30 ML: at 11:46

## 2018-08-31 RX ADMIN — FAMOTIDINE 20 MG: 10 INJECTION, SOLUTION INTRAVENOUS at 11:46

## 2018-08-31 ASSESSMENT — PAIN SCALES - GENERAL
PAINLEVEL_OUTOF10: 0
PAINLEVEL_OUTOF10: 0
PAINLEVEL_OUTOF10: 3
PAINLEVEL_OUTOF10: 0

## 2018-08-31 ASSESSMENT — PATIENT HEALTH QUESTIONNAIRE - PHQ9
1. LITTLE INTEREST OR PLEASURE IN DOING THINGS: NOT AT ALL
2. FEELING DOWN, DEPRESSED, IRRITABLE, OR HOPELESS: NOT AT ALL
SUM OF ALL RESPONSES TO PHQ9 QUESTIONS 1 AND 2: 0

## 2018-08-31 ASSESSMENT — LIFESTYLE VARIABLES: EVER_SMOKED: NEVER

## 2018-08-31 NOTE — OP REPORT
DATE OF SERVICE:  2018    PREOPERATIVE DIAGNOSES:  1.  Intrauterine pregnancy at 38 weeks.  2.  Prior  section x3.  3.  Desires permanent sterility.  4.  Anti-c antigen positive.  5.  Morbid obesity.  6.  Graves disease.  7.  Desires permanent sterility.    POSTOPERATIVE DIAGNOSES:  1.  Intrauterine pregnancy at 38 weeks.  2.  Prior  section x3.  3.  Desires permanent sterility.  4.  Anti-c antigen positive.  5.  Morbid obesity.  6.  Graves disease.  7.  Desires permanent sterility.  8.  Nuchal cord x1 and omental adhesions.    PROCEDURE PERFORMED:  Repeat lower uterine segment transverse  section   and bilateral salpingectomy.    SURGEON:  Zhanna Prieto MD    ASSISTANT:  Frederic Lopez MD    ANESTHESIA:  Spinal.    ANESTHESIOLOGIST:  Ezekiel Gardiner MD    ESTIMATED BLOOD LOSS:  500 mL.    FLUIDS:  2000 mL crystalloid.    URINE OUTPUT:  150 mL of clear urine.    FINDINGS:  Live born male infant, weight 6 pounds 12 ounces, Apgars 8 and 9,   clear amniotic fluid, loose nuchal cord x1, normal appearing placenta with   3-vessel cord.  Normal appearing uterus, tubes and ovaries. Omental adhesions.     SPECIMENS:  Segments of right and left fallopian tube to pathology.    DRAINS:  Erwin to gravity.    COMPLICATIONS:  None apparent.    DISPOSITION:  The patient to recovery room in stable condition.    TECHNIQUE:  After adequate informed consent was obtained, the patient was   taken to the operating room.  She was placed under spinal anesthetic and laid   in the supine position with the left lateral tilt.  Fetal heart tones were   obtained and found to be in the 130s.  She had a Erwin catheter placed to   gravity and sequential stockings placed on her lower extremities.  She   received 3 g of Ancef preoperatively.  She was then prepped and draped in the   usual sterile fashion.  A time-out was taken to confirm the proper patient and   procedure.  After ensuring adequate anesthesia, her prior  Pfannenstiel skin   incision was excised and the incision was carried down sharply to the fascia.    The fascia was nicked in the midline and dissected bilaterally using sharp   dissection.  The fascia was then  from the rectus muscle both   superiorly and inferiorly using sharp dissection.  The peritoneum was   identified and entered bluntly after ensuring no evidence of bowel or bladder   underneath.  The peritoneum was then dissected superiorly and inferiorly using   sharp dissection.  There were some filmy omental adhesions and peritoneal   adhesions to the anterior abdominal wall.  These were taken down with   electrocautery and sharp dissection.  An Rupesh O retractor was placed with   care taken to ensure no bowel impingement occurred.  Using Metzenbaums,   bladder flap was created.  At this time, a scalpel was used to make a   transverse incision across the lower uterine segment to the level of the   amniotic sac.  The amniotic sac was encountered and ruptured and clear fluid   was noted.  The uterine incision was extended bilaterally using blunt   dissection.  The surgeon's hand was placed in the uterine cavity and the fetal   head was elevated and delivered without complication.  Mouth and nose were   bulb suctioned and a loose nuchal cord x1 was easily reduced.  The rest of the   baby delivered easily.  This was a viable male infant, weight 6 pounds 12   ounces, Apgars 8 and 9.  There was delayed cord clamping for 45 seconds.  The   cord was then clamped twice and cut.  The placenta was removed with manual   extraction.  The patient then received 20 units of IV Pitocin and 1000 mL of   lactated Ringers IV.  The uterine cavity was curetted using a dry lap sponge   and there was no evidence of any retained products of conception.  The uterine   incision was identified and closed using a single suture of 0 Vicryl in a   running locked fashion.  The wound was packed.  The patient and her     were again asked if she wanted to be permanently sterilized and they both   agreed.  The left fallopian tube was identified and followed to its fimbriated   end.  It was removed in its entirety using the LigaSure vessel sealing   device.  The same procedure was accomplished on the right fallopian tube.    Good hemostasis was noted.  Inspection of the uterine incision revealed good   hemostasis.  The Rupesh O retractor was removed and Seprafilm was placed   across the anterior surface of the uterus in the lower uterine segment.  The   peritoneum was identified and closed using a single suture of 2-0 Vicryl in a   running fashion.  The rectus muscles were reapproximated in the midline using   a figure-of-eight suture.  The wound was irrigated copiously and no bleeding   was noted beneath the fascia.  The fascia was reapproximated using 2 separate   sutures of 0-Vicryl in a running fashion overlapping in the midline.  The   subcutaneous tissue was irrigated and no bleeding was noted.  Scarpas fascia   was reapproximated using 3-0 Vicryl in a running fashion.  The subcutaneous   tissue was reapproximated using 3-0 Vicryl in a running fashion.  The skin was   reapproximated using 4-0 Vicryl in a running fashion.  Steri-Strips were   placed and Mepilex dressing was applied.  Sponge and instrument counts were   correct x3.  The patient tolerated the procedure well and there were no   apparent complications and she was taken to the recovery room in stable   condition.       ____________________________________     MD SAMMY BRADY / MUNIR    DD:  08/31/2018 13:48:27  DT:  08/31/2018 14:23:35    D#:  [id number]  Job#:  [id number]    cc: AWILDA GUADALUPE MD

## 2018-09-01 LAB
ERYTHROCYTE [DISTWIDTH] IN BLOOD BY AUTOMATED COUNT: 42.8 FL (ref 35.9–50)
HCT VFR BLD AUTO: 32.2 % (ref 37–47)
HGB BLD-MCNC: 10.7 G/DL (ref 12–16)
MCH RBC QN AUTO: 29.7 PG (ref 27–33)
MCHC RBC AUTO-ENTMCNC: 33.2 G/DL (ref 33.6–35)
MCV RBC AUTO: 89.4 FL (ref 81.4–97.8)
PLATELET # BLD AUTO: 173 K/UL (ref 164–446)
PMV BLD AUTO: 11.8 FL (ref 9–12.9)
RBC # BLD AUTO: 3.6 M/UL (ref 4.2–5.4)
WBC # BLD AUTO: 11.2 K/UL (ref 4.8–10.8)

## 2018-09-01 PROCEDURE — A9270 NON-COVERED ITEM OR SERVICE: HCPCS | Performed by: OBSTETRICS & GYNECOLOGY

## 2018-09-01 PROCEDURE — 36415 COLL VENOUS BLD VENIPUNCTURE: CPT

## 2018-09-01 PROCEDURE — 770002 HCHG ROOM/CARE - OB PRIVATE (112)

## 2018-09-01 PROCEDURE — 85027 COMPLETE CBC AUTOMATED: CPT

## 2018-09-01 PROCEDURE — 700102 HCHG RX REV CODE 250 W/ 637 OVERRIDE(OP): Performed by: OBSTETRICS & GYNECOLOGY

## 2018-09-01 PROCEDURE — 700111 HCHG RX REV CODE 636 W/ 250 OVERRIDE (IP): Performed by: ANESTHESIOLOGY

## 2018-09-01 RX ORDER — DIPHENHYDRAMINE HCL 25 MG
25 TABLET ORAL EVERY 6 HOURS PRN
Status: DISCONTINUED | OUTPATIENT
Start: 2018-09-01 | End: 2018-09-02 | Stop reason: HOSPADM

## 2018-09-01 RX ORDER — DIPHENHYDRAMINE HYDROCHLORIDE 50 MG/ML
25 INJECTION INTRAMUSCULAR; INTRAVENOUS EVERY 6 HOURS PRN
Status: DISCONTINUED | OUTPATIENT
Start: 2018-09-01 | End: 2018-09-02 | Stop reason: HOSPADM

## 2018-09-01 RX ORDER — OXYCODONE HYDROCHLORIDE AND ACETAMINOPHEN 5; 325 MG/1; MG/1
1 TABLET ORAL EVERY 4 HOURS PRN
Status: DISCONTINUED | OUTPATIENT
Start: 2018-09-01 | End: 2018-09-02 | Stop reason: HOSPADM

## 2018-09-01 RX ORDER — ONDANSETRON 4 MG/1
4 TABLET, ORALLY DISINTEGRATING ORAL EVERY 6 HOURS PRN
Status: DISCONTINUED | OUTPATIENT
Start: 2018-09-01 | End: 2018-09-02 | Stop reason: HOSPADM

## 2018-09-01 RX ORDER — ONDANSETRON 2 MG/ML
4 INJECTION INTRAMUSCULAR; INTRAVENOUS EVERY 6 HOURS PRN
Status: DISCONTINUED | OUTPATIENT
Start: 2018-09-01 | End: 2018-09-02 | Stop reason: HOSPADM

## 2018-09-01 RX ORDER — IBUPROFEN 600 MG/1
600 TABLET ORAL EVERY 6 HOURS PRN
Status: DISCONTINUED | OUTPATIENT
Start: 2018-09-01 | End: 2018-09-02 | Stop reason: HOSPADM

## 2018-09-01 RX ORDER — OXYCODONE AND ACETAMINOPHEN 10; 325 MG/1; MG/1
1 TABLET ORAL EVERY 4 HOURS PRN
Status: DISCONTINUED | OUTPATIENT
Start: 2018-09-01 | End: 2018-09-02 | Stop reason: HOSPADM

## 2018-09-01 RX ORDER — MORPHINE SULFATE 4 MG/ML
4 INJECTION, SOLUTION INTRAMUSCULAR; INTRAVENOUS
Status: DISCONTINUED | OUTPATIENT
Start: 2018-09-01 | End: 2018-09-02 | Stop reason: HOSPADM

## 2018-09-01 RX ADMIN — OXYCODONE HYDROCHLORIDE AND ACETAMINOPHEN 1 TABLET: 5; 325 TABLET ORAL at 18:29

## 2018-09-01 RX ADMIN — KETOROLAC TROMETHAMINE 30 MG: 30 INJECTION, SOLUTION INTRAMUSCULAR at 06:49

## 2018-09-01 RX ADMIN — IBUPROFEN 600 MG: 600 TABLET, FILM COATED ORAL at 13:15

## 2018-09-01 RX ADMIN — IBUPROFEN 600 MG: 600 TABLET, FILM COATED ORAL at 20:12

## 2018-09-01 RX ADMIN — Medication 1 TABLET: at 06:49

## 2018-09-01 RX ADMIN — KETOROLAC TROMETHAMINE 30 MG: 30 INJECTION, SOLUTION INTRAMUSCULAR at 01:14

## 2018-09-01 ASSESSMENT — PAIN SCALES - GENERAL
PAINLEVEL_OUTOF10: 3
PAINLEVEL_OUTOF10: 1
PAINLEVEL_OUTOF10: 1
PAINLEVEL_OUTOF10: 4
PAINLEVEL_OUTOF10: 3
PAINLEVEL_OUTOF10: 2
PAINLEVEL_OUTOF10: 3

## 2018-09-01 NOTE — PROGRESS NOTES
Assumed care of patient at 1915, received report from day RN at that time. Communication board updated and reviewed POC with pt and FOB. Assessment complete. Pt running fluids and has a harrington with an active order. No other needs at this time. Call light and personal belongings within reach.

## 2018-09-01 NOTE — CONSULTS
Salma Gooden R.N. Lactation Consultant Signed   Progress Notes Date of Service: 9/1/2018  2:36 PM         []Hide copied text  []Aracely for attribution information  Baby born at 38 wks gestation to a mother who has a hx of Graves Disease and thyroidectomy.. She is now on Synthroid. She did not have problem getting pregnant and has a history of over production when she  her other babies. She and  both have anti-c AB+ and previous baby treated in PICU with IVIG for high bili.  This baby is very sleepy was treated for low blood sugar this morning and will hardly nurse after latching. Mom has been supplementing. Talked to mother about starting a plan that includes trying to latch, supplementing using the high risk amounts and then pumping to bring her supply in. She agrees to this. Her  wants to use formula and not donor milk.        Salma Gooden R.N. Lactation Consultant Addendum   Progress Notes Date of Service: 9/1/2018  2:36 PM         []Hide copied text  []Aracely for attribution information  Baby born at 38 wks gestation to a mother who has a hx of Graves Disease and thyroidectomy.. She is now on Synthroid. She did not have problem getting pregnant and has a history of over production when she  her other babies. She and  both have anti-c AB+ and previous baby treated in PICU with IVIG for high bili.  This baby is very sleepy was treated for low blood sugar this morning and will hardly nurse after latching. Mom has been supplementing. Talked to mother about starting a plan that includes trying to latch, supplementing using the high risk amounts and then pumping to bring her supply in. She agrees to this. Her  wants to use formula and not donor milk.     1700-Mom set up with pump. She pumped with settings: 80 to 60 after 2 min, 30% for 15 min and got 15 mls colostrum which she will feed baby at next feeding. Before pumping I was called to the bedside because baby  wouldn't latch to mother and wouldn't take a bottle. Holding baby in seated, slightly sidelying position gently introduced nipple into baby's mouth. At first he kept his tongue above the nipple. He slowly started sucking and taking in milk but did not pace himself. Helped him pace for a few suck runs and then he improved and self paced. I used chin and cheek support during feeding. He took 20 mls (formula). My impression is that he is younger than 38 weeks and his skills are more like those of a 35-36 wk gestation baby.     Plan: Mom doesn't really want to pump but has agreed to pump or hand express her colostrum so baby can have the benefits of it for now. Tonight she will offer her breast but if no cues will bottle feed baby with amounts on the white supplementation guide due to the low blood sugars today. She will pump or hand express afterward and save that milk for the next feeding. She has also agreed to do skin to skin as much as possible and feed Q2-3 hrs.      Revision History

## 2018-09-01 NOTE — PROGRESS NOTES
1445- Patient arrived to Room S353.  Report received from MAXIME Crowley RN.  Patient assessment done.  IV patent.  Sequential stockings on.  Abdominal dressing is clean, dry, and intact.  Indwelling catheter is to gravity with clear urine.  Discussed pain management with patient, to include schedule pain medication.  Patient prefers to call for further pain intervention as needed.  Patient oriented to room, call system, and infant security.  Reviewed plan of care.  FOB at bedside.

## 2018-09-01 NOTE — PROGRESS NOTES
POD 1      Afebrile, VS normal, UO adequate  + flatus, tolerating clear liquids  Well, denies N/V    LAB:      Results for CHELSEY OTTO (MRN [id number]) as of 2018 08:04   2018 10:35 2018 04:21   WBC 9.6 11.2 (H)   Hemoglobin 12.3 10.7 (L)   Hematocrit 37.3 32.2 (L)   Platelet Count 168 173       PE: lungs CTA, abdomen soft/NT, normal bowel sounds, wound dressing clean and dry, calves NT, Leon negative bilaterally.    PLAN: Postop care, advance diet as tolerated, increase activity as tolerated.

## 2018-09-01 NOTE — CARE PLAN
Problem: Discharge Barriers/Planning  Goal: Patient's continuum of care needs will be met  Pt tolerated having her harrington taken out and has urinated twice within 6 hours.     Problem: Pain Management  Goal: Pain level will decrease to patient's comfort goal  Pts current pain is tolerated with just her scheduled medication at this time. Will continue to assess.

## 2018-09-02 ENCOUNTER — PATIENT MESSAGE (OUTPATIENT)
Dept: MEDICAL GROUP | Facility: PHYSICIAN GROUP | Age: 33
End: 2018-09-02

## 2018-09-02 VITALS
TEMPERATURE: 98.6 F | RESPIRATION RATE: 16 BRPM | OXYGEN SATURATION: 97 % | HEART RATE: 73 BPM | BODY MASS INDEX: 45.52 KG/M2 | SYSTOLIC BLOOD PRESSURE: 119 MMHG | HEIGHT: 67 IN | WEIGHT: 290 LBS | DIASTOLIC BLOOD PRESSURE: 69 MMHG

## 2018-09-02 PROCEDURE — 700102 HCHG RX REV CODE 250 W/ 637 OVERRIDE(OP): Performed by: OBSTETRICS & GYNECOLOGY

## 2018-09-02 PROCEDURE — 0UB70ZZ EXCISION OF BILATERAL FALLOPIAN TUBES, OPEN APPROACH: ICD-10-PCS | Performed by: OBSTETRICS & GYNECOLOGY

## 2018-09-02 PROCEDURE — A9270 NON-COVERED ITEM OR SERVICE: HCPCS | Performed by: OBSTETRICS & GYNECOLOGY

## 2018-09-02 RX ORDER — IBUPROFEN 600 MG/1
600 TABLET ORAL EVERY 6 HOURS PRN
Qty: 30 TAB | Refills: 3 | Status: SHIPPED | OUTPATIENT
Start: 2018-09-02

## 2018-09-02 RX ORDER — OXYCODONE HYDROCHLORIDE AND ACETAMINOPHEN 5; 325 MG/1; MG/1
1-2 TABLET ORAL EVERY 4 HOURS PRN
Qty: 30 TAB | Refills: 0 | Status: SHIPPED | OUTPATIENT
Start: 2018-09-02 | End: 2018-09-09

## 2018-09-02 RX ADMIN — OXYCODONE HYDROCHLORIDE AND ACETAMINOPHEN 1 TABLET: 5; 325 TABLET ORAL at 00:08

## 2018-09-02 RX ADMIN — IBUPROFEN 600 MG: 600 TABLET, FILM COATED ORAL at 13:15

## 2018-09-02 RX ADMIN — OXYCODONE HYDROCHLORIDE AND ACETAMINOPHEN 1 TABLET: 5; 325 TABLET ORAL at 13:15

## 2018-09-02 RX ADMIN — IBUPROFEN 600 MG: 600 TABLET, FILM COATED ORAL at 04:06

## 2018-09-02 RX ADMIN — OXYCODONE HYDROCHLORIDE AND ACETAMINOPHEN 1 TABLET: 10; 325 TABLET ORAL at 09:21

## 2018-09-02 RX ADMIN — OXYCODONE HYDROCHLORIDE AND ACETAMINOPHEN 1 TABLET: 10; 325 TABLET ORAL at 17:11

## 2018-09-02 RX ADMIN — OXYCODONE HYDROCHLORIDE AND ACETAMINOPHEN 1 TABLET: 5; 325 TABLET ORAL at 04:06

## 2018-09-02 RX ADMIN — Medication 1 TABLET: at 04:06

## 2018-09-02 ASSESSMENT — PAIN SCALES - GENERAL
PAINLEVEL_OUTOF10: 3
PAINLEVEL_OUTOF10: 7
PAINLEVEL_OUTOF10: 3
PAINLEVEL_OUTOF10: 3
PAINLEVEL_OUTOF10: 5
PAINLEVEL_OUTOF10: 2
PAINLEVEL_OUTOF10: 7
PAINLEVEL_OUTOF10: 6
PAINLEVEL_OUTOF10: 4

## 2018-09-02 NOTE — CARE PLAN
Problem: Communication  Goal: The ability to communicate needs accurately and effectively will improve  Outcome: PROGRESSING AS EXPECTED  Patient wants to only bottle feed infant, education done and she verbalized understanding. Pt. Ambulating, taking adequate PO fluids and voiding.     Problem: Pain Management  Goal: Pain level will decrease to patient's comfort goal  Outcome: PROGRESSING AS EXPECTED  Pt. Likes to keep PRN pain medication as scheduled.

## 2018-09-02 NOTE — PROGRESS NOTES
Assessment done. Vital signs stable. Patient voiding without difficulty, claims to be passing flatus. Fundus firm at umbilicus with light lochia.  Patient ambulating with steady gait. Patient claims to have good pain relief with p.o meds. Bottle feeding infant on demand. Family at bedside. Patient encouraged to call for any needs. Pt would like her medications as soon as they are available

## 2018-09-02 NOTE — DISCHARGE SUMMARY
DATE OF ADMISSION:  2018    DATE OF DISCHARGE:  2018    ADMITTING DIAGNOSES:  1.  Intrauterine pregnancy at 38 weeks.  2.  Prior  section x3.  3.  Desires permanent sterility.  4.  Anti-c antigen positive.  5.  Morbid obesity.  6.  Graves' disease.    HISTORY AND PHYSICAL:  Please see previously dictated history and physical.    HOSPITAL COURSE:  The patient was admitted to labor and delivery on 2018   for a repeat  section and bilateral salpingectomy.  She underwent   this procedure without complication.  The indication for delivery at 38 weeks   was because of her anti-c antigen positive status.  She had an estimated blood   loss for the procedure 500 mL.  She had a viable male infant, weight 6 pounds   12 ounces, Apgars 8 and 9.  The patient's postpartum and postoperative course   has been unremarkable and on postoperative day 2, she was tolerating a   regular diet, had a bowel movement, was breastfeeding, and was ready to be   discharged home.  She had minimal lochia and was voiding well.    PHYSICAL EXAMINATION:  VITAL SIGNS:  On discharge, temperature is 98.3, pulse 68, and blood pressure   99/58.  ABDOMEN:  Obese, nondistended, minimally tender.  Bowel sounds are positive   and normoactive.  Her fundus is firm below the umbilicus.  Her Mepilex   dressing is dry.  EXTREMITIES:  Show +1 pedal edema.  She has no cords or Homans sign.    LABORATORY DATA:  Her postoperative H and H were 10 and 34.    DISCHARGE DIAGNOSES:  1.  Intrauterine pregnancy at 38 weeks.  2.  Status post repeat lower uterine segment transverse  section and   bilateral salpingectomy.  3.  Anti-c antigen positive.  4.  Graves' disease.  5.  Morbid obesity.    DISCHARGE INSTRUCTIONS:  1.  Patient will be discharged home with instructions to follow up with Dr. Prieto in 2 weeks.  She is given instruction on her Mepilex dressing and when   to remove this.  2.  She is given a prescription for Percocet  and ibuprofen and encouraged to   continue her levothyroxine for her Graves' disease.  She is also encouraged to   continue prenatal vitamins while breastfeeding.  3.  She is instructed on no lifting over 10 pounds for 6 weeks and no driving   for 2 weeks.       ____________________________________     MD SAMMY BRADY / MUNIR    DD:  09/02/2018 09:25:31  DT:  09/02/2018 10:24:18    D#:  [id number]  Job#:  [id number]

## 2018-09-02 NOTE — CARE PLAN
Problem: Altered physiologic condition related to postoperative  delivery  Goal: Patient physiologically stable as evidenced by normal lochia, palpable uterine involution and vital signs within normal limits  Outcome: PROGRESSING AS EXPECTED  FF@U with light lochia    Problem: Potential for postpartum infection related to surgical incision, compromised uterine condition, urinary tract or respiratory compromise  Goal: Patient will be afebrile and free from signs and symptoms of infection  Outcome: PROGRESSING AS EXPECTED  No signs of infection noted at time of assessment

## 2018-09-02 NOTE — PROGRESS NOTES
4415- Bedside report received from CRISTIAN Ogden.  Patient denied needs.  1130- Patient assessment done.  Patient stated that she is voiding without difficulty and passing flatus.  Patient denied dizziness and stated that she is walking without difficulty.  Discussed pain management plan and patient prefers to call for pain intervention.  Reviewed plan of care.  Patient instructed to ambulate in hallways.  Patient instructed to use incentive spirometer hourly.  Patient verbalized understanding.  FOB at bedside.

## 2018-09-02 NOTE — PROGRESS NOTES
POD #2    S:  Pt doing well.  Markell reg diet.  + Flatus and BM. Minimal lochia. No problems voiding.  Breast feeding.  Wants to go home if baby released    O: T 98.3 P 68  BP 99/58  ABD: Obeses, ND, minimally tender, BS + normoactive, FF below umb, dressing dry  EXT: + 1 pedal edema, no cords or Homans  H/H 10/32    A/P:  POD #2 S/P repeat C/S and BTL - doing well  1.  D/C home  2.  F/U Dr. Prieto 2 weeks  3.  Rx for Percocet and motrin

## 2018-09-02 NOTE — DISCHARGE INSTRUCTIONS
POSTPARTUM DISCHARGE INSTRUCTIONS FOR MOM    YOB: 1985   Age: 33 y.o.               Admit Date: 2018     Discharge Date: 2018  Attending Doctor:  Zhanna Prieto M.D.                  Allergies:  Morphine    Discharged to home by car. Discharged via wheelchair, hospital escort: Yes.  Special equipment needed: Not Applicable  Belongings with: Personal  Be sure to schedule a follow-up appointment with your primary care doctor or any specialists as instructed.     Discharge Plan:   Diet Plan: Discussed  Activity Level: Discussed  Confirmed Follow up Appointment: Patient to Call and Schedule Appointment  Influenza Vaccine Indication: Patient Refuses    REASONS TO CALL YOUR OBSTETRICIAN:  1.   Persistent fever or shaking chills (Temperature higher than 100.4)  2.   Heavy bleeding (soaking more than 1 pad per hour); Passing clots  3.   Foul odor from vagina  4.   Mastitis (Breast infection; breast pain, chills, fever, redness)  5.   Urinary pain, burning or frequency  6.   Episiotomy infection  7.   Abdominal incision infection  8.   Severe depression longer than 24 hours    HAND WASHING  · Prior to handling the baby.  · Before breastfeeding or bottle feeding baby.  · After using the bathroom or changing the baby's diaper.    WOUND CARE  Ask your physician for additional care instructions.  In general:    ·  Incision:      · Keep clean and dry.    · Do NOT lift anything heavier than your baby for up to 6 weeks.    · There should not be any opening or pus.      VAGINAL CARE  · Nothing inside vagina for 6 weeks: no sexual intercourse, tampons or douching.  · Bleeding may continue for 2-4 weeks.  Amount may vary.    · Call your physician for heavy bleeding which means soaking more than 1 pad per hour    BIRTH CONTROL  · It is possible to become pregnant at any time after delivery and while breastfeeding.  · Plan to discuss a method of birth control with your physician at your follow up visit.  "visit.    DIET AND ELIMINATION  · Eating more fiber (bran cereal, fruits, and vegetables) and drinking plenty of fluids will help to avoid constipation.  · Urinary frequency after childbirth is normal.    POSTPARTUM BLUES  During the first few days after birth, you may experience a sense of the \"blues\" which may include impatience, irritability or even crying.  These feeling come and go quickly.  However, as many as 1 in 10 women experience emotional symptoms known as postpartum depression.    Postpartum depression:  May start as early as the second or third day after delivery or take several weeks or months to develop.  Symptoms of \"blues\" are present, but are more intense:  Crying spells; loss of appetite; feelings of hopelessness or loss of control; fear of touching the baby; over concern or no concern at all about the baby; little or no concern about your own appearance/caring for yourself; and/or inability to sleep or excessive sleeping.  Contact your physician if you are experiencing any of these symptoms.    Crisis Hotline:  · [place] Crisis Hotline:  5-815-PZPXTIM  Or 1-649.263.9577  · [place] Crisis Hotline:  1-111.987.8374  Or 619-975-4634    PREVENTING SHAKEN BABY:  If you are angry or stressed, PUT THE BABY IN THE CRIB, step away, take some deep breaths, and wait until you are calm to care for the baby.  DO NOT SHAKE THE BABY.  You are not alone, call a supporter for help.    · Crisis Call Center 24/7 crisis line 451-105-1302 or 1-451.827.6146  · You can also text them, text \"ANSWER\" to 754694    QUIT SMOKING/TOBACCO USE:  I understand the use of any tobacco products increases my chance of suffering from future heart disease and could cause other illnesses which may shorten my life. Quitting the use of tobacco products is the single most important thing I can do to improve my health. For further information on smoking / tobacco cessation call a Toll Free Quit Line at 1-235.936.8075 (*National Cancer " [place]) or 1-927.875.5606 (American Lung Association) or you can access the web based program at www.lungusa.org.    · [place] Tobacco Users Help Line:  (661) 653-6931       Toll Free: 1-394.718.3764  · Quit Tobacco Program [facility] (493)944-3611    DEPRESSION / SUICIDE RISK:  As you are discharged from this [facility], it is important to learn how to keep safe from harming yourself.    Recognize the warning signs:  · Abrupt changes in personality, positive or negative- including increase in energy   · Giving away possessions  · Change in eating patterns- significant weight changes-  positive or negative  · Change in sleeping patterns- unable to sleep or sleeping all the time   · Unwillingness or inability to communicate  · Depression  · Unusual sadness, discouragement and loneliness  · Talk of wanting to die  · Neglect of personal appearance   · Rebelliousness- reckless behavior  · Withdrawal from people/activities they love  · Confusion- inability to concentrate     If you or a loved one observes any of these behaviors or has concerns about self-harm, here's what you can do:  · Talk about it- your feelings and reasons for harming yourself  · Remove any means that you might use to hurt yourself (examples: pills, rope, extension cords, firearm)  · Get professional help from the community (Mental Health, Substance Abuse, psychological counseling)  · Do not be alone:Call your Safe Contact- someone whom you trust who will be there for you.  · Call your local CRISIS HOTLINE 274-4179 or 893-771-4284  · Call your local Children's Mobile Crisis Response Team [place] (117) 922-0803 or www.[employer]  · Call the toll free National Suicide Prevention Hotlines   · National Suicide Prevention Lifeline 474-227-LWAT (3173)  · National Hope Line Network 800-SUICIDE (384-2431)    DISCHARGE SURVEY:  Thank you for choosing [facility].  We hope we provided you with very good care.   You may be receiving a survey in the mail.  Please fill it out.  Your opinion is valuable to us.    ADDITIONAL EDUCATIONAL MATERIALS GIVEN TO PATIENT:        My signature on this form indicates that:  1.  I have reviewed and understand the above information  2.  My questions regarding this information have been answered to my satisfaction.  3.  I have formulated a plan with my discharge nurse to obtain my prescribed medication for home.

## 2018-09-03 NOTE — PROGRESS NOTES
Discharge order reinstated and patient planning to discharge to home with infant.  Reviewed instructions for self and infant. Questions answered and understanding verbalized. Will discharge to home with  and infant

## 2018-09-03 NOTE — PROGRESS NOTES
[facility] care [facility]. Pt resting comfortably at this time with no needs.  1950-baby was discharged after bilirubin labs improved.  Dr. Fernández called and updated on orders. Will discharge infant and pt. Home soon.

## 2018-09-28 DIAGNOSIS — E89.0 POSTOPERATIVE HYPOTHYROIDISM: ICD-10-CM

## 2018-10-11 ENCOUNTER — HOSPITAL ENCOUNTER (OUTPATIENT)
Dept: LAB | Facility: MEDICAL CENTER | Age: 33
End: 2018-10-11
Attending: OBSTETRICS & GYNECOLOGY
Payer: OTHER GOVERNMENT

## 2018-10-11 PROCEDURE — 88175 CYTOPATH C/V AUTO FLUID REDO: CPT

## 2018-10-12 LAB — CYTOLOGY REG CYTOL: NORMAL

## 2018-10-30 ENCOUNTER — PATIENT MESSAGE (OUTPATIENT)
Dept: MEDICAL GROUP | Facility: PHYSICIAN GROUP | Age: 33
End: 2018-10-30

## 2018-10-30 ENCOUNTER — HOSPITAL ENCOUNTER (OUTPATIENT)
Dept: LAB | Facility: MEDICAL CENTER | Age: 33
End: 2018-10-30
Attending: NURSE PRACTITIONER
Payer: OTHER GOVERNMENT

## 2018-10-30 PROCEDURE — 84443 ASSAY THYROID STIM HORMONE: CPT

## 2018-10-30 PROCEDURE — 36415 COLL VENOUS BLD VENIPUNCTURE: CPT

## 2018-10-30 PROCEDURE — 84439 ASSAY OF FREE THYROXINE: CPT

## 2018-10-31 LAB
T4 FREE SERPL-MCNC: 1.53 NG/DL (ref 0.53–1.43)
TSH SERPL DL<=0.005 MIU/L-ACNC: 0.07 UIU/ML (ref 0.38–5.33)

## 2018-11-01 DIAGNOSIS — E03.9 ACQUIRED HYPOTHYROIDISM: ICD-10-CM

## 2018-11-01 RX ORDER — LEVOTHYROXINE SODIUM 175 UG/1
175 TABLET ORAL
Qty: 60 TAB | Refills: 0 | Status: SHIPPED | OUTPATIENT
Start: 2018-11-01 | End: 2018-12-20 | Stop reason: SDUPTHER

## 2018-11-05 ENCOUNTER — PATIENT MESSAGE (OUTPATIENT)
Dept: MEDICAL GROUP | Facility: PHYSICIAN GROUP | Age: 33
End: 2018-11-05

## 2018-11-06 ENCOUNTER — TELEPHONE (OUTPATIENT)
Dept: MEDICAL GROUP | Facility: PHYSICIAN GROUP | Age: 33
End: 2018-11-06

## 2018-11-06 NOTE — TELEPHONE ENCOUNTER
Regarding: RE: Non-Urgent Medical Question  Contact: [phone]  ----- Message from BERT Rios sent at 11/5/2018  7:21 PM PST -----       ----- Message sent from BERT Rios to Meli Shah at 11/5/2018  7:20 PM -----   I don't have any openings, but I can squeeze her in. I can have Carlota call you first thing in the morning to put you on the schedule. The earliest would be 8am, but I'm not sure if that would be too early.    Thank you,    MICHAEL Nichole      ----- Message -----     From: Meli Shah     Sent: 11/5/2018  6:09 PM PST       To: BERT Rios  Subject: Non-Urgent Medical Question    Owen Dewey,   My daughter Belen has strep throat I believe. Do you have any appointments tomorrow?? Thank you.

## 2018-11-06 NOTE — TELEPHONE ENCOUNTER
Phone Number Called: [phone] (home)       Message: Called and spoke to patient. She said daughter was feeling better this morning. She wants to wait and see how she feels after school. Let patient know if she feels worse to call back and bring her in. LM     Left Message for patient to call back: no

## 2018-12-03 ENCOUNTER — PATIENT MESSAGE (OUTPATIENT)
Dept: MEDICAL GROUP | Facility: PHYSICIAN GROUP | Age: 33
End: 2018-12-03

## 2018-12-17 ENCOUNTER — PATIENT MESSAGE (OUTPATIENT)
Dept: MEDICAL GROUP | Facility: PHYSICIAN GROUP | Age: 33
End: 2018-12-17

## 2018-12-19 ENCOUNTER — HOSPITAL ENCOUNTER (OUTPATIENT)
Dept: LAB | Facility: MEDICAL CENTER | Age: 33
End: 2018-12-19
Attending: NURSE PRACTITIONER
Payer: OTHER GOVERNMENT

## 2018-12-19 DIAGNOSIS — E03.9 ACQUIRED HYPOTHYROIDISM: ICD-10-CM

## 2018-12-19 LAB — TSH SERPL DL<=0.005 MIU/L-ACNC: 0.41 UIU/ML (ref 0.38–5.33)

## 2018-12-19 PROCEDURE — 36415 COLL VENOUS BLD VENIPUNCTURE: CPT

## 2018-12-19 PROCEDURE — 84443 ASSAY THYROID STIM HORMONE: CPT

## 2018-12-20 ENCOUNTER — PATIENT MESSAGE (OUTPATIENT)
Dept: MEDICAL GROUP | Facility: PHYSICIAN GROUP | Age: 33
End: 2018-12-20

## 2018-12-20 DIAGNOSIS — E03.9 ACQUIRED HYPOTHYROIDISM: ICD-10-CM

## 2018-12-20 RX ORDER — LEVOTHYROXINE SODIUM 175 UG/1
175 TABLET ORAL
Qty: 90 TAB | Refills: 1 | Status: SHIPPED | OUTPATIENT
Start: 2018-12-20

## 2018-12-28 ENCOUNTER — PATIENT MESSAGE (OUTPATIENT)
Dept: MEDICAL GROUP | Facility: PHYSICIAN GROUP | Age: 33
End: 2018-12-28

## 2018-12-31 ENCOUNTER — PATIENT MESSAGE (OUTPATIENT)
Dept: MEDICAL GROUP | Facility: PHYSICIAN GROUP | Age: 33
End: 2018-12-31

## 2019-01-02 ENCOUNTER — PATIENT MESSAGE (OUTPATIENT)
Dept: MEDICAL GROUP | Facility: PHYSICIAN GROUP | Age: 34
End: 2019-01-02

## 2019-01-02 NOTE — TELEPHONE ENCOUNTER
From: Meli Shah  To: BERT Rios  Sent: 1/2/2019 9:52 AM PST  Subject: RE: Non-Urgent Medical Question    Hi there,   Terrell got released last night and we are headed home today. We are driving so we will get there late. Can we get an appointment for tomorrow?     Meli  ----- Message -----  From: BERT Rios  Sent: 12/31/18, 8:31 AM  To: Meli Shah  Subject: RE: Non-Urgent Medical Question    That should be fine, but let me know when you get discharged/are home we can always get him in same day.    Thank you,    MICHAEL Nichole      ----- Message -----   From: Meli Shah   Sent: 12/31/2018 8:28 AM PST   To: BERT Rios  Subject: RE: Non-Urgent Medical Question    he seems to be on the mend but no talk yet of discharging him. I did make him an appointment for his 4 month check up on the 9th of Jan. I used my account here on [employer] but put in the notes that it is to be for Terrell. Is that soon enough for do we need one sooner if he gets out in the next day or two? Also I made an appointment for Eulogio his brother for the 10th-also using my [employer]. I just want to make sure the appointments get made. Thank you    ----- Message -----  From: BERT Rios  Sent: 12/31/18, 8:21 AM  To: Meli Shah  Subject: RE: Non-Urgent Medical Question    I hope all is well and he gets out soon! I would recommend coming in for follow-up when you are back in town.    Thank you,    MICHAEL Nichole      ----- Message -----   From: Meli Shah   Sent: 12/28/2018 11:55 PM PST   To: Maco Tariq A.P.R.N.  Subject: Non-Urgent Medical Question    Hi there, this message is about Terrell Shah [birth year]-. We have been visiting family over the Christmas holiday and he has contracted a respiratory virus and we have taken him to the ER and they have admitted  him. The docs are thinking it's RSV. We are out of town in [place]. I am not sure what we will need to do once he leaves the hospital. Our insurance says to contact his PCM within 24hrs. My number is [phone] if you need to get a hold of me. Thank you     Meli

## 2019-02-01 ENCOUNTER — PATIENT MESSAGE (OUTPATIENT)
Dept: MEDICAL GROUP | Facility: PHYSICIAN GROUP | Age: 34
End: 2019-02-01

## 2019-02-04 NOTE — TELEPHONE ENCOUNTER
Phone Number Called: [phone] (home)       Message: Called and spoke to mother. Made patient an apt for the peds office. LM     Left Message for patient to call back: N\A

## 2019-02-04 NOTE — PATIENT COMMUNICATION
Can we get Terrell scheduled for 4 month shots at the peds office? And can we pend the shots on the visit?

## 2019-03-18 ENCOUNTER — OFFICE VISIT (OUTPATIENT)
Dept: MEDICAL GROUP | Facility: PHYSICIAN GROUP | Age: 34
End: 2019-03-18
Payer: OTHER GOVERNMENT

## 2019-03-18 VITALS
DIASTOLIC BLOOD PRESSURE: 60 MMHG | HEIGHT: 68 IN | TEMPERATURE: 98.3 F | HEART RATE: 74 BPM | BODY MASS INDEX: 39.4 KG/M2 | SYSTOLIC BLOOD PRESSURE: 100 MMHG | WEIGHT: 260 LBS | OXYGEN SATURATION: 100 %

## 2019-03-18 DIAGNOSIS — E89.0 POSTOPERATIVE HYPOTHYROIDISM: ICD-10-CM

## 2019-03-18 DIAGNOSIS — Z86.39 HISTORY OF GRAVES' DISEASE: ICD-10-CM

## 2019-03-18 DIAGNOSIS — Z23 NEED FOR VACCINATION: ICD-10-CM

## 2019-03-18 PROCEDURE — 90471 IMMUNIZATION ADMIN: CPT | Performed by: NURSE PRACTITIONER

## 2019-03-18 PROCEDURE — 90686 IIV4 VACC NO PRSV 0.5 ML IM: CPT | Performed by: NURSE PRACTITIONER

## 2019-03-18 PROCEDURE — 99214 OFFICE O/P EST MOD 30 MIN: CPT | Mod: 25 | Performed by: NURSE PRACTITIONER

## 2019-03-18 ASSESSMENT — PATIENT HEALTH QUESTIONNAIRE - PHQ9: CLINICAL INTERPRETATION OF PHQ2 SCORE: 0

## 2019-03-18 NOTE — PROGRESS NOTES
Chief Complaint   Patient presents with   • Follow-Up     [employer] forms       HISTORY OF THE PRESENT ILLNESS: This is a 33 y.o. female established patient who presents today for follow up.    Patient is here to have [employer] forms completed. Discussed need for follow-up, related to hypothyroidism.    Patient has history of postoperative hypothyroidism. She is on synthroid. States she does not feel as good on the lower dose of this medication. She has not had labs done in a while and needs updated thyroid labs. Patient reports having intermittent muscle twitches to her legs and arms. She denies any other associated symptoms at this time. She denies any palpitations. No fevers or chills. Feels well overall.      Past Medical History:   Diagnosis Date   • Anesthesia     sensitive   • Cancer (HCC) 2015    thyroid cancer   • Graves disease 2014   • Hypothyroidism (acquired)    • Pregnant        Past Surgical History:   Procedure Laterality Date   • REPEAT C SECTOIN WITH SALPINGECTOMY Bilateral 8/31/2018    Procedure: REPEAT C SECTION WITH SALPINGECTOMY;  Surgeon: Zhanna Prieto M.D.;  Location: LABOR AND DELIVERY;  Service: Labor and Delivery   • THYROIDECTOMY TOTAL  2015   • PRIMARY C SECTION      2009, 2011, 2014       Family Status   Relation Status   • Mo Alive   • Fa Alive   • Sis Alive     Family History   Problem Relation Age of Onset   • Hyperlipidemia Mother    • Cancer Father         Prostate   • Other Sister         PCOS       Social History   Substance Use Topics   • Smoking status: Never Smoker   • Smokeless tobacco: Never Used   • Alcohol use No       Allergies: Morphine    Current Outpatient Prescriptions Ordered in [facility]   Medication Sig Dispense Refill   • levothyroxine (SYNTHROID) 175 MCG Tab Take 1 Tab by mouth Every morning on an empty stomach. 90 Tab 1   • ibuprofen (MOTRIN) 600 MG Tab Take 1 Tab by mouth every 6 hours as needed (For cramping after delivery; do not give if patient is receiving  "ketorolac (Toradol)). (Patient not taking: Reported on 3/18/2019) 30 Tab 3   • Prenatal Vit-Fe Fumarate-FA (PRENATAL VITAMIN PO) Take  by mouth every day.       No current [facility]-ordered facility-administered medications on file.        Review of Systems   Constitutional: Negative for fever, chills, weight loss and malaise/fatigue.   Cardiovascular: Negative for chest pain, palpitations, orthopnea and leg swelling.   Musculoskeletal: Muscle twitches. Negative for myalgias, back pain and joint pain.   All other systems reviewed and are negative except as in HPI.    Exam: Blood pressure 100/60, pulse 74, temperature 36.8 °C (98.3 °F), temperature source Temporal, height 1.727 m (5' 8\"), weight 117.9 kg (260 lb), last menstrual period 03/07/2019, SpO2 100 %, not currently breastfeeding.  General:  Normal appearing. No distress.  HEENT:  Normocephalic. Eyes conjunctiva clear lids without ptosis, pupils equal and reactive to light accommodation, ears normal shape and contour, canals are clear bilaterally, tympanic membranes are benign, nasal mucosa benign, oropharynx is without erythema, edema or exudates.   Neck:  Supple without JVD or bruit. Thyroid is not present.  Pulmonary:  Clear to ausculation.  Normal effort. No rales, ronchi, or wheezing.  Cardiovascular:  Regular rate and rhythm without murmur. Carotid and radial pulses are intact and equal bilaterally.  Abdomen:  Soft, nontender, nondistended. Normal bowel sounds. Liver and spleen are not palpable  Neurologic:  Grossly nonfocal  Lymph:  No cervical, supraclavicular or axillary lymph nodes are palpable  Skin:  Warm and dry.  No obvious lesions.  Musculoskeletal:  Normal gait. No extremity cyanosis, clubbing, or edema.  Psych:  Normal mood and affect. Alert and oriented x3. Judgment and insight is normal.    PLAN:    1. Need for vaccination  - Flu Quad Inj >3 Year Pre-Filled (Preservative Free)    2. Postoperative hypothyroidism  Continue current medication and " repeat lab.  - TSH; Future     Follow-up depending on lab results. Patient is encouraged to be seen in the emergency room for chest pain, palpitations, shortness of breath, dizziness, severe abdominal pain or other concerning symptoms.    Patient was seen for 25 minutes face to face of which, greater than 50% was spent counseling regarding paperwork and hypothyroidism.      Please note that this dictation was created using voice recognition software. I have made every reasonable attempt to correct obvious errors, but I expect that there are errors of grammar and possibly content that I did not discover before finalizing the note.      Assessment/Plan  1. Need for vaccination  Flu Quad Inj >3 Year Pre-Filled (Preservative Free)   2. Postoperative hypothyroidism  TSH         I have placed the below orders and discussed them with an approved delegating provider. The MA is performing the below orders under the direction of Dr. Lantigua.       Skinny SHARMA (Scribe), am scribing for, and in the presence of, MICHAEL Nichole    Electronically signed by: Skinny Randhawa (Veronica), 3/18/2019    Maco SHARMA APRN personally performed the services described in this documentation, as scribed by Skinny Randhawa in my presence, and it is both accurate and complete.

## 2019-03-20 ENCOUNTER — HOSPITAL ENCOUNTER (OUTPATIENT)
Dept: LAB | Facility: MEDICAL CENTER | Age: 34
End: 2019-03-20
Attending: NURSE PRACTITIONER
Payer: OTHER GOVERNMENT

## 2019-03-20 DIAGNOSIS — E89.0 POSTOPERATIVE HYPOTHYROIDISM: ICD-10-CM

## 2019-03-20 LAB
T4 FREE SERPL-MCNC: 1.26 NG/DL (ref 0.53–1.43)
TSH SERPL DL<=0.005 MIU/L-ACNC: 0.44 UIU/ML (ref 0.38–5.33)

## 2019-03-20 PROCEDURE — 36415 COLL VENOUS BLD VENIPUNCTURE: CPT

## 2019-03-20 PROCEDURE — 84439 ASSAY OF FREE THYROXINE: CPT

## 2019-03-20 PROCEDURE — 84443 ASSAY THYROID STIM HORMONE: CPT

## 2019-04-01 ENCOUNTER — APPOINTMENT (OUTPATIENT)
Dept: MEDICAL GROUP | Facility: PHYSICIAN GROUP | Age: 34
End: 2019-04-01
Payer: OTHER GOVERNMENT

## 2019-06-20 ENCOUNTER — PATIENT MESSAGE (OUTPATIENT)
Dept: MEDICAL GROUP | Facility: PHYSICIAN GROUP | Age: 34
End: 2019-06-20

## (undated) DEVICE — SUTURE 0 VICRYL PLUS CT-1 - 36 INCH (36/BX)

## (undated) DEVICE — LIGASURE SM JAW SEALER CRVD - (6EA/CA)

## (undated) DEVICE — 0 CHROMIC CT-1

## (undated) DEVICE — TRAY SPINAL ANESTHESIA NON-SAFETY (10/CA)

## (undated) DEVICE — DETERGENT RENUZYME PLUS 10 OZ PACKET (50/BX)

## (undated) DEVICE — TUBING CLEARLINK DUO-VENT - C-FLO (48EA/CA)

## (undated) DEVICE — WATER IRRIG. STER. 1500 ML - (9/CA)

## (undated) DEVICE — SUTURE 4-0 VICRYL PLUSFS-1 - 27 INCH (36/BX)

## (undated) DEVICE — SUTURE 2-0 VICRYL PLUS CT-1 36 (36PK/BX)"

## (undated) DEVICE — CATHETER IV NON-SAFETY 18 GA X 1 1/4 (50/BX 4BX/CA)

## (undated) DEVICE — PACK C-SECTION (2EA/CA)

## (undated) DEVICE — SET EXTENSION WITH 2 PORTS (48EA/CA) ***PART #2C8610 IS A SUBSTITUTE*****

## (undated) DEVICE — ELECTRODE DUAL RETURN W/ CORD - (50/PK)

## (undated) DEVICE — SODIUM CHL IRRIGATION 0.9% 1000ML (12EA/CA)

## (undated) DEVICE — SWABSTICK BENZOIN SINGLE (50EA/BX)

## (undated) DEVICE — CLOSURE SKIN STRIP 1/2 X 4 IN - (STERI STRIP) (50/BX 4BX/CA)

## (undated) DEVICE — HEAD HOLDER JUNIOR/ADULT

## (undated) DEVICE — GLOVE BIOGEL SZ 6.5 SURGICAL PF LTX (50PR/BX 4BX/CA)

## (undated) DEVICE — SUTURE 0 GUT-PLAIN (36PK/BX)

## (undated) DEVICE — KIT  I.V. START (100EA/CA)

## (undated) DEVICE — SUTURE 3-0 VICRYL PLUS CT-1 - 36 INCH (36/BX)